# Patient Record
Sex: MALE | Race: WHITE | NOT HISPANIC OR LATINO | Employment: FULL TIME | URBAN - METROPOLITAN AREA
[De-identification: names, ages, dates, MRNs, and addresses within clinical notes are randomized per-mention and may not be internally consistent; named-entity substitution may affect disease eponyms.]

---

## 2018-01-11 ENCOUNTER — OFFICE VISIT (OUTPATIENT)
Dept: FAMILY MEDICINE | Facility: CLINIC | Age: 53
End: 2018-01-11
Payer: COMMERCIAL

## 2018-01-11 VITALS
HEART RATE: 70 BPM | OXYGEN SATURATION: 99 % | WEIGHT: 237.6 LBS | TEMPERATURE: 97.1 F | SYSTOLIC BLOOD PRESSURE: 136 MMHG | BODY MASS INDEX: 33.14 KG/M2 | DIASTOLIC BLOOD PRESSURE: 88 MMHG

## 2018-01-11 DIAGNOSIS — H69.91 DYSFUNCTION OF RIGHT EUSTACHIAN TUBE: Primary | ICD-10-CM

## 2018-01-11 PROCEDURE — 99213 OFFICE O/P EST LOW 20 MIN: CPT | Performed by: PHYSICIAN ASSISTANT

## 2018-01-11 RX ORDER — FLUTICASONE PROPIONATE 50 MCG
1-2 SPRAY, SUSPENSION (ML) NASAL DAILY
Qty: 1 G | Refills: 11 | Status: SHIPPED | OUTPATIENT
Start: 2018-01-11 | End: 2020-03-02

## 2018-01-11 NOTE — PROGRESS NOTES
SUBJECTIVE:                                                    Dimitris Munoz is a 52 year old male who presents to clinic today for the following health issues:    Ear Problem      Duration:  2 weeks     Description (location/character/radiation): right ear pain, ringing, and loss of hearing     Intensity:  moderate    Accompanying signs and symptoms:  Feels like fluid in ears     History (similar episodes/previous evaluation): was seen at an urgent care    Precipitating or alleviating factors:     Therapies tried and outcome: prednisone, Augmentin    Overall much better. No pain, sinus symptoms are gone. Just con't ear pressure, fullness.       Problem list and histories reviewed & adjusted, as indicated.  Additional history: as documented    Patient Active Problem List   Diagnosis     CARDIOVASCULAR SCREENING; LDL GOAL LESS THAN 160     High triglycerides     Past Surgical History:   Procedure Laterality Date     COLONOSCOPY WITH CO2 INSUFFLATION N/A 4/2/2015    Procedure: COLONOSCOPY WITH CO2 INSUFFLATION;  Surgeon: Alfredo Kowalski MD;  Location: [facility] OR     HERNIA REPAIR         Social History   Substance Use Topics     Smoking status: Never Smoker     Smokeless tobacco: Never Used     Alcohol use Yes      Comment: 10 beers per week     Family History   Problem Relation Age of Onset     CANCER Father 53     skin cancer         Current Outpatient Prescriptions   Medication Sig Dispense Refill     fluticasone (FLONASE) 50 MCG/ACT spray Spray 1-2 sprays into both nostrils daily 1 g 11     IBUPROFEN PO Take 200 mg by mouth 3 times daily 400 to 600 mg       tadalafil (CIALIS) 20 MG tablet Take 0.5-1 tablets (10-20 mg) by mouth daily as needed for erectile dysfunction Never use with nitroglycerin, terazosin or doxazosin. (Patient not taking: Reported on 1/11/2018) 12 tablet 11     No Known Allergies  BP Readings from Last 3 Encounters:   01/11/18 136/88   08/25/16 130/70   11/18/15 128/80    Wt Readings from  Last 3 Encounters:   01/11/18 237 lb 9.6 oz (107.8 kg)   08/25/16 224 lb (101.6 kg)   11/18/15 232 lb (105.2 kg)                  Labs reviewed in EPIC        ROS:  Constitutional, HEENT, cardiovascular, pulmonary, gi and gu systems are negative, except as otherwise noted.      OBJECTIVE:   /88  Pulse 70  Temp 97.1  F (36.2  C) (Oral)  Wt 237 lb 9.6 oz (107.8 kg)  SpO2 99%  BMI 33.14 kg/m2  Body mass index is 33.14 kg/(m^2).  GENERAL: healthy, alert and no distress  Head: Normocephalic, atraumatic.  Eyes: Conjunctiva clear, non icteric. PERRLA.  Ears: External ears normal BL. TM: Normal, slight fluid right TM   Nose: Septum midline, nasal mucosa pink and moist. No discharge.  Mouth / Throat: Normal dentition.  No oral lesions. Pharynx non erythematous, tonsils without hypertrophy.  Neck: Supple, no enlarged LN, trachea midline.   RESP: lungs clear to auscultation - no rales, rhonchi or wheezes  CV: regular rate and rhythm, normal S1 S2, no S3 or S4, no murmur, click or rub, no peripheral edema       ASSESSMENT/PLAN:          ICD-10-CM    1. Dysfunction of right eustachian tube H69.81 fluticasone (FLONASE) 50 MCG/ACT spray   Warning signs discussed.  side effects discussed  Symptomatic treatment: such as fluids,  OTC acetaminophen and /or non-steroidal anti-inflammatory medication.  Follow up  4 wks as needed     Byron Modi PA-C  [facility]

## 2018-01-11 NOTE — NURSING NOTE
"Chief Complaint   Patient presents with     Ear Problem       Initial /88  Pulse 70  Temp 97.1  F (36.2  C) (Oral)  Wt 237 lb 9.6 oz (107.8 kg)  SpO2 99%  BMI 33.14 kg/m2 Estimated body mass index is 33.14 kg/(m^2) as calculated from the following:    Height as of 11/18/15: 5' 11\" (1.803 m).    Weight as of this encounter: 237 lb 9.6 oz (107.8 kg).  Medication Reconciliation: complete  Katie Smith CMA    "

## 2018-01-11 NOTE — MR AVS SNAPSHOT
After Visit Summary   1/11/2018    Dimitris Munoz    MRN: [id number]           Patient Information     Date Of Birth          1965        Visit Information        Provider Department      1/11/2018 1:30 PM Byron Modi PA-C [facility]        Today's Diagnoses     Dysfunction of right eustachian tube    -  1       Follow-ups after your visit        Who to contact     If you have questions or need follow up information about today's clinic visit or your schedule please contact [facility] directly at [phone].  Normal or non-critical lab and imaging results will be communicated to you by [employer], letter or phone within 4 business days after the clinic has received the results. If you do not hear from us within 7 days, please contact the clinic through [facility] or phone. If you have a critical or abnormal lab result, we will notify you by phone as soon as possible.  Submit refill requests through [employer] or call your pharmacy and they will forward the refill request to us. Please allow 3 business days for your refill to be completed.          Additional Information About Your Visit        MyChart Information     [employer] gives you secure access to your electronic health record. If you see a primary care provider, you can also send messages to your care team and make appointments. If you have questions, please call your primary care clinic.  If you do not have a primary care provider, please call [phone] and they will assist you.        Care EveryWhere ID     This is your Care EveryWhere ID. This could be used by other organizations to access your [facility] medical records  [id number]        Your Vitals Were     Pulse Temperature Pulse Oximetry BMI (Body Mass Index)          70 97.1  F (36.2  C) (Oral) 99% 33.14 kg/m2         Blood Pressure from Last 3 Encounters:   01/11/18 136/88   08/25/16 130/70   11/18/15 128/80    Weight from Last 3 Encounters:    01/11/18 237 lb 9.6 oz (107.8 kg)   08/25/16 224 lb (101.6 kg)   11/18/15 232 lb (105.2 kg)              Today, you had the following     No orders found for display         Today's Medication Changes          These changes are accurate as of: 1/11/18  2:13 PM.  If you have any questions, ask your nurse or doctor.               Start taking these medicines.        Dose/Directions    fluticasone 50 MCG/ACT spray   Commonly known as:  FLONASE   Used for:  Dysfunction of right eustachian tube   Started by:  Byron Modi PA-C        Dose:  1-2 spray   Spray 1-2 sprays into both nostrils daily   Quantity:  1 g   Refills:  11            Where to get your medicines      These medications were sent to [facility] #[id number] - [place], [place] - [address],  [address]  [address], , [place] [facility] [address]     Phone:  [phone]     fluticasone 50 MCG/ACT spray                Primary Care Provider Office Phone # Fax #    Byron Modi PA-C [phone] [address] [address] [address]        Equal Access to Services     MANJIT ALMANZAR AH: Hadii doug patel hadasho Soomaali, waaxda luqadaha, qaybta kaalmada adeegyada, waxay mary alice haybernice smith. So [facility] [phone].    ATENCIÓN: Si habla español, tiene a nelson disposición servicios gratuitos de asistencia lingüística. Llame al [phone].    We comply with applicable federal civil rights laws and [place] laws. We do not discriminate on the basis of race, color, national origin, age, disability, sex, sexual orientation, or gender identity.            Thank you!     Thank you for choosing [facility]  for your care. Our goal is always to provide you with excellent care. Hearing back from our patients is one way we can continue to improve our services. Please take a few minutes to complete the written survey that you may receive in the mail after your visit with us. Thank you!              Your Updated Medication List - Protect others around you: Learn how to safely use, store and throw away your medicines at www.disposemymeds.org.          This list is accurate as of: 1/11/18  2:13 PM.  Always use your most recent med list.                   Brand Name Dispense Instructions for use Diagnosis    fluticasone 50 MCG/ACT spray    FLONASE    1 g    Spray 1-2 sprays into both nostrils daily    Dysfunction of right eustachian tube       IBUPROFEN PO      Take 200 mg by mouth 3 times daily 400 to 600 mg        tadalafil 20 MG tablet    CIALIS    12 tablet    Take 0.5-1 tablets (10-20 mg) by mouth daily as needed for erectile dysfunction Never use with nitroglycerin, terazosin or doxazosin.    ED (erectile dysfunction)

## 2018-08-08 NOTE — PROGRESS NOTES
"  SUBJECTIVE:   CC: Dimitris Munoz is an 52 year old male who presents for preventative health visit.     Healthy Habits:    Do you get at least three servings of calcium containing foods daily (dairy, green leafy vegetables, etc.)? {YES/NO, DAIRY INTAKE:082946::\"yes\"}    Amount of exercise or daily activities, outside of work: {AMOUNT EXERCISE:868449}    Problems taking medications regularly {Yes /No default:407021::\"No\"}    Medication side effects: {Yes /No default.:274311::\"No\"}    Have you had an eye exam in the past two years? {YESNOBLANK:682393}    Do you see a dentist twice per year? {YESNOBLANK:119201}    Do you have sleep apnea, excessive snoring or daytime drowsiness?{YESNOBLANK:136096}  {Outside tests to abstract? :280287}     {additional problems to add (Optional):343300}    Today's PHQ-2 Score:   PHQ-2 ( 1999 [employer]) 11/18/2015 7/21/2015   Q1: Little interest or pleasure in doing things 0 0   Q2: Feeling down, depressed or hopeless 0 0   PHQ-2 Score 0 0     {PHQ-2 LOOK IN ASSESSMENTS :291009}  Abuse: Current or Past(Physical, Sexual or Emotional)- {YES/NO/NA:161664}  Do you feel safe in your environment - {YES/NO/NA:932199}    Social History   Substance Use Topics     Smoking status: Never Smoker     Smokeless tobacco: Never Used     Alcohol use Yes      Comment: 10 beers per week      If you drink alcohol do you typically have >3 drinks per day or >7 drinks per week? {ETOH :579215}                      Last PSA:   PSA   Date Value Ref Range Status   06/23/2009 0.78 0 - 4 ug/L Final       Reviewed orders with patient. Reviewed health maintenance and updated orders accordingly - {Yes/No:812674::\"Yes\"}  {Chronicprobdata (Optional):271914}    Reviewed and updated as needed this visit by clinical staff         Reviewed and updated as needed this visit by Provider        {HISTORY OPTIONS (Optional):955129}    ROS:  { :449352::\"CONSTITUTIONAL: NEGATIVE for fever, chills, change in " "weight\",\"INTEGUMENTARY/SKIN: NEGATIVE for worrisome rashes, moles or lesions\",\"EYES: NEGATIVE for vision changes or irritation\",\"ENT: NEGATIVE for ear, mouth and throat problems\",\"RESP: NEGATIVE for significant cough or SOB\",\"CV: NEGATIVE for chest pain, palpitations or peripheral edema\",\"GI: NEGATIVE for nausea, abdominal pain, heartburn, or change in bowel habits\",\" male: negative for dysuria, hematuria, decreased urinary stream, erectile dysfunction, urethral discharge\",\"MUSCULOSKELETAL: NEGATIVE for significant arthralgias or myalgia\",\"NEURO: NEGATIVE for weakness, dizziness or paresthesias\",\"PSYCHIATRIC: NEGATIVE for changes in mood or affect\"}    OBJECTIVE:   There were no vitals taken for this visit.  EXAM:  {Exam Choices:048975}    {Diagnostic Test Results (Optional):072290::\"Diagnostic Test Results:\",\"none \"}    ASSESSMENT/PLAN:   {Diag Picklist:072917}    COUNSELING:  {MALE COUNSELING MESSAGES:018005::\"Reviewed preventive health counseling, as reflected in patient instructions\"}    BP Readings from Last 1 Encounters:   01/11/18 136/88     Estimated body mass index is 33.14 kg/(m^2) as calculated from the following:    Height as of 11/18/15: 5' 11\" (1.803 m).    Weight as of 1/11/18: 237 lb 9.6 oz (107.8 kg).    {BP Counseling- Complete if BP >= 120/80  (Optional):919676}  {Weight Management Plan (ACO) Complete if BMI is abnormal-  Ages 18-64  BMI >24.9.  Age 65+ with BMI <23 or >30 (Optional):336160}     reports that he has never smoked. He has never used smokeless tobacco.  {Tobacco Cessation -- Complete if patient is a smoker (Optional):045927}    Counseling Resources:  ATP IV Guidelines  Pooled Cohorts Equation Calculator  FRAX Risk Assessment  ICSI Preventive Guidelines  Dietary Guidelines for Americans, 2010  USDA's MyPlate  ASA Prophylaxis  Lung CA Screening    Byron Modi PA-C  [facility]  "

## 2018-08-09 ENCOUNTER — OFFICE VISIT (OUTPATIENT)
Dept: FAMILY MEDICINE | Facility: CLINIC | Age: 53
End: 2018-08-09
Payer: COMMERCIAL

## 2018-08-09 VITALS
RESPIRATION RATE: 16 BRPM | OXYGEN SATURATION: 97 % | TEMPERATURE: 98.1 F | HEIGHT: 71 IN | HEART RATE: 57 BPM | WEIGHT: 226 LBS | SYSTOLIC BLOOD PRESSURE: 124 MMHG | DIASTOLIC BLOOD PRESSURE: 76 MMHG | BODY MASS INDEX: 31.64 KG/M2

## 2018-08-09 DIAGNOSIS — Z80.8 FH: MELANOMA: ICD-10-CM

## 2018-08-09 DIAGNOSIS — Z12.5 SPECIAL SCREENING FOR MALIGNANT NEOPLASM OF PROSTATE: ICD-10-CM

## 2018-08-09 DIAGNOSIS — L72.3 SEBACEOUS CYST: ICD-10-CM

## 2018-08-09 DIAGNOSIS — Z00.00 ROUTINE GENERAL MEDICAL EXAMINATION AT A HEALTH CARE FACILITY: Primary | ICD-10-CM

## 2018-08-09 DIAGNOSIS — L98.9 SKIN LESION: ICD-10-CM

## 2018-08-09 DIAGNOSIS — Z11.59 NEED FOR HEPATITIS C SCREENING TEST: ICD-10-CM

## 2018-08-09 LAB
ALBUMIN SERPL-MCNC: 3.6 G/DL (ref 3.4–5)
ALP SERPL-CCNC: 71 U/L (ref 40–150)
ALT SERPL W P-5'-P-CCNC: 28 U/L (ref 0–70)
ANION GAP SERPL CALCULATED.3IONS-SCNC: 6 MMOL/L (ref 3–14)
AST SERPL W P-5'-P-CCNC: 19 U/L (ref 0–45)
BILIRUB SERPL-MCNC: 1 MG/DL (ref 0.2–1.3)
BUN SERPL-MCNC: 21 MG/DL (ref 7–30)
CALCIUM SERPL-MCNC: 8.7 MG/DL (ref 8.5–10.1)
CHLORIDE SERPL-SCNC: 108 MMOL/L (ref 94–109)
CHOLEST SERPL-MCNC: 127 MG/DL
CO2 SERPL-SCNC: 29 MMOL/L (ref 20–32)
CREAT SERPL-MCNC: 1.26 MG/DL (ref 0.66–1.25)
GFR SERPL CREATININE-BSD FRML MDRD: 59 ML/MIN/1.7M2
GLUCOSE SERPL-MCNC: 96 MG/DL (ref 70–99)
HCV AB SERPL QL IA: NONREACTIVE
HDLC SERPL-MCNC: 27 MG/DL
LDLC SERPL CALC-MCNC: 65 MG/DL
NONHDLC SERPL-MCNC: 100 MG/DL
POTASSIUM SERPL-SCNC: 4.2 MMOL/L (ref 3.4–5.3)
PROT SERPL-MCNC: 6.6 G/DL (ref 6.8–8.8)
SODIUM SERPL-SCNC: 143 MMOL/L (ref 133–144)
TRIGL SERPL-MCNC: 177 MG/DL

## 2018-08-09 PROCEDURE — 80053 COMPREHEN METABOLIC PANEL: CPT | Performed by: PHYSICIAN ASSISTANT

## 2018-08-09 PROCEDURE — 84153 ASSAY OF PSA TOTAL: CPT | Mod: 90 | Performed by: PHYSICIAN ASSISTANT

## 2018-08-09 PROCEDURE — 80061 LIPID PANEL: CPT | Performed by: PHYSICIAN ASSISTANT

## 2018-08-09 PROCEDURE — 99000 SPECIMEN HANDLING OFFICE-LAB: CPT | Performed by: PHYSICIAN ASSISTANT

## 2018-08-09 PROCEDURE — 36415 COLL VENOUS BLD VENIPUNCTURE: CPT | Performed by: PHYSICIAN ASSISTANT

## 2018-08-09 PROCEDURE — 84154 ASSAY OF PSA FREE: CPT | Mod: 90 | Performed by: PHYSICIAN ASSISTANT

## 2018-08-09 PROCEDURE — 86803 HEPATITIS C AB TEST: CPT | Performed by: PHYSICIAN ASSISTANT

## 2018-08-09 PROCEDURE — 99396 PREV VISIT EST AGE 40-64: CPT | Performed by: PHYSICIAN ASSISTANT

## 2018-08-09 ASSESSMENT — ENCOUNTER SYMPTOMS
COUGH: 0
FEVER: 0
PARESTHESIAS: 0
DIARRHEA: 0
HEARTBURN: 0
JOINT SWELLING: 0
EYE PAIN: 0
PALPITATIONS: 1
NAUSEA: 0
CHILLS: 0
HEADACHES: 1
MYALGIAS: 0
FREQUENCY: 0
ARTHRALGIAS: 1
DIZZINESS: 0
WEAKNESS: 0
NERVOUS/ANXIOUS: 0
SHORTNESS OF BREATH: 0
DYSURIA: 0
CONSTIPATION: 0
SORE THROAT: 0

## 2018-08-09 NOTE — PROGRESS NOTES
SUBJECTIVE:   CC: iDmitris Munoz is an 52 year old male who presents for preventative health visit.     Physical   Annual:     Getting at least 3 servings of Calcium per day:  Yes    Bi-annual eye exam:  Yes    Dental care twice a year:  Yes    Sleep apnea or symptoms of sleep apnea:  Daytime drowsiness and Excessive snoring    Diet:  Regular (no restrictions) and Breakfast skipped    Frequency of exercise:  2-3 days/week    Duration of exercise:  Greater than 60 minutes    Taking medications regularly:  Yes    Medication side effects:  None    Additional concerns today:  No    Mole on right side - changing in size and appearance. Noticed about 6 months ago   Lump on right side of neck he would like looked at- been there for years. No changes.   Father  of melanoma. He has never seen a dermatologist.       Today's PHQ-2 Score:   PHQ-2 (  [employer]) 2018   Q1: Little interest or pleasure in doing things 0   Q2: Feeling down, depressed or hopeless 0   PHQ-2 Score 0   Q1: Little interest or pleasure in doing things Not at all   Q2: Feeling down, depressed or hopeless Not at all   PHQ-2 Score 0       Abuse: Current or Past(Physical, Sexual or Emotional)- No  Do you feel safe in your environment - Yes    Social History   Substance Use Topics     Smoking status: Never Smoker     Smokeless tobacco: Never Used     Alcohol use Yes      Comment: 10 beers per week     Alcohol Use 2018   If you drink alcohol do you typically have greater than 3 drinks per day OR greater than 7 drinks per week? No       Last PSA:   PSA   Date Value Ref Range Status   2009 0.78 0 - 4 ug/L Final       Reviewed orders with patient. Reviewed health maintenance and updated orders accordingly - Yes  Labs reviewed in EPIC  BP Readings from Last 3 Encounters:   18 124/76   18 136/88   16 130/70    Wt Readings from Last 3 Encounters:   18 226 lb (102.5 kg)   18 237 lb 9.6 oz (107.8 kg)   16 224 lb  (101.6 kg)                  Patient Active Problem List   Diagnosis     CARDIOVASCULAR SCREENING; LDL GOAL LESS THAN 160     High triglycerides     FH: melanoma     Past Surgical History:   Procedure Laterality Date     COLONOSCOPY WITH CO2 INSUFFLATION N/A 4/2/2015    Procedure: COLONOSCOPY WITH CO2 INSUFFLATION;  Surgeon: Alfredo Kowalski MD;  Location: [facility] OR     HERNIA REPAIR         Social History   Substance Use Topics     Smoking status: Never Smoker     Smokeless tobacco: Never Used     Alcohol use Yes      Comment: 10 beers per week     Family History   Problem Relation Age of Onset     Cancer Father 53     skin cancer         Current Outpatient Prescriptions   Medication Sig Dispense Refill     IBUPROFEN PO Take 200 mg by mouth 3 times daily 400 to 600 mg       fluticasone (FLONASE) 50 MCG/ACT spray Spray 1-2 sprays into both nostrils daily (Patient not taking: Reported on 8/9/2018) 1 g 11     tadalafil (CIALIS) 20 MG tablet Take 0.5-1 tablets (10-20 mg) by mouth daily as needed for erectile dysfunction Never use with nitroglycerin, terazosin or doxazosin. (Patient not taking: Reported on 8/9/2018) 12 tablet 11     No Known Allergies    Reviewed and updated as needed this visit by clinical staff  Tobacco  Allergies  Meds  Med Hx  Surg Hx  Fam Hx  Soc Hx        Reviewed and updated as needed this visit by Provider          Past Medical History:   Diagnosis Date     CARDIOVASCULAR SCREENING; LDL GOAL LESS THAN 160       Past Surgical History:   Procedure Laterality Date     COLONOSCOPY WITH CO2 INSUFFLATION N/A 4/2/2015    Procedure: COLONOSCOPY WITH CO2 INSUFFLATION;  Surgeon: Alfredo Kowalski MD;  Location: [facility] OR     HERNIA REPAIR         Review of Systems   Constitutional: Negative for chills and fever.   HENT: Negative for congestion, ear pain, hearing loss and sore throat.    Eyes: Negative for pain and visual disturbance.   Respiratory: Negative for cough and shortness of breath.   "  Cardiovascular: Positive for palpitations. Negative for peripheral edema.   Gastrointestinal: Negative for constipation, diarrhea, heartburn and nausea.   Genitourinary: Positive for impotence. Negative for discharge, dysuria, frequency, genital sores and urgency.   Musculoskeletal: Positive for arthralgias. Negative for joint swelling and myalgias.   Skin: Negative for rash.   Neurological: Positive for headaches. Negative for dizziness, weakness and paresthesias.   Psychiatric/Behavioral: Negative for mood changes. The patient is not nervous/anxious.      CONSTITUTIONAL: NEGATIVE for fever, chills, change in weight  INTEGUMENTARY/SKIN: NEGATIVE for worrisome rashes, moles or lesions  EYES: NEGATIVE for vision changes or irritation  ENT: NEGATIVE for ear, mouth and throat problems  RESP: NEGATIVE for significant cough or SOB  CV: NEGATIVE for chest pain, palpitations or peripheral edema  GI: NEGATIVE for nausea, abdominal pain, heartburn, or change in bowel habits   male: negative for dysuria, hematuria, decreased urinary stream, erectile dysfunction, urethral discharge  MUSCULOSKELETAL: NEGATIVE for significant arthralgias or myalgia  NEURO: NEGATIVE for weakness, dizziness or paresthesias  PSYCHIATRIC: NEGATIVE for changes in mood or affect    OBJECTIVE:   /76  Pulse 57  Temp 98.1  F (36.7  C) (Oral)  Resp 16  Ht 5' 11\" (1.803 m)  Wt 226 lb (102.5 kg)  SpO2 97%  BMI 31.52 kg/m2    Physical Exam  GENERAL: healthy, alert and no distress  EYES: Eyes grossly normal to inspection, PERRL and conjunctivae and sclerae normal  HENT: ear canals and TM's normal, nose and mouth without ulcers or lesions  NECK: no adenopathy, no asymmetry, masses, or scars and thyroid normal to palpation  RESP: lungs clear to auscultation - no rales, rhonchi or wheezes  CV: regular rate and rhythm, normal S1 S2, no S3 or S4, no murmur, click or rub, no peripheral edema and peripheral pulses strong  ABDOMEN: soft, nontender, " "no hepatosplenomegaly, no masses and bowel sounds normal   (male): normal male genitalia without lesions or urethral discharge, no hernia  MS: no gross musculoskeletal defects noted, no edema  SKIN: no suspicious lesions or rashes  SKIN: no suspicious lesions or rashes and a very small cherry angioma on abdomin. Right upper neck with 1 cm mobile cystic lesion.   NEURO: Normal strength and tone, mentation intact and speech normal  PSYCH: mentation appears normal, affect normal/bright    Diagnostic Test Results:  No results found for this or any previous visit (from the past 24 hour(s)).    ASSESSMENT/PLAN:       ICD-10-CM    1. Routine general medical examination at a health care facility Z00.00 Lipid panel reflex to direct LDL Fasting     Comprehensive metabolic panel   2. Sebaceous cyst L72.3    3. Skin lesion L98.9 DERMATOLOGY REFERRAL   4. Need for hepatitis C screening test Z11.59 Hepatitis C Screen Reflex to HCV RNA Quant and Genotype   5. Special screening for malignant neoplasm of prostate Z12.5 PSA, total and free   6. FH: melanoma Z80.8 DERMATOLOGY REFERRAL       COUNSELING:   Reviewed preventive health counseling, as reflected in patient instructions       Regular exercise       Healthy diet/nutrition    BP Readings from Last 1 Encounters:   08/09/18 124/76     Estimated body mass index is 31.52 kg/(m^2) as calculated from the following:    Height as of this encounter: 5' 11\" (1.803 m).    Weight as of this encounter: 226 lb (102.5 kg).      Weight management plan: Discussed healthy diet and exercise guidelines and patient will follow up in 12 months in clinic to re-evaluate.     reports that he has never smoked. He has never used smokeless tobacco.      Counseling Resources:  ATP IV Guidelines  Pooled Cohorts Equation Calculator  FRAX Risk Assessment  ICSI Preventive Guidelines  Dietary Guidelines for Americans, 2010  USDA's MyPlate  ASA Prophylaxis  Lung CA Screening    Byron Modi, " TANNER  [facility]

## 2018-08-09 NOTE — NURSING NOTE
"Chief Complaint   Patient presents with     Physical     Health Maintenance     PHQ2       Initial /76  Pulse 57  Temp 98.1  F (36.7  C) (Oral)  Resp 16  Ht 5' 11\" (1.803 m)  Wt 226 lb (102.5 kg)  SpO2 97%  BMI 31.52 kg/m2 Estimated body mass index is 31.52 kg/(m^2) as calculated from the following:    Height as of this encounter: 5' 11\" (1.803 m).    Weight as of this encounter: 226 lb (102.5 kg).  Medication Reconciliation: complete  Katie Smith CMA    "

## 2018-08-09 NOTE — MR AVS SNAPSHOT
After Visit Summary   8/9/2018    Dimitris Munoz    MRN: [id number]           Patient Information     Date Of Birth          1965        Visit Information        Provider Department      8/9/2018 7:00 AM Byron Modi PA-C [facility]        Today's Diagnoses     Routine general medical examination at a health care facility    -  1    Sebaceous cyst        Skin lesion        Need for hepatitis C screening test        Special screening for malignant neoplasm of prostate        FH: melanoma          Care Instructions      Preventive Health Recommendations  Male Ages 50 - 64    Yearly exam:             See your health care provider every year in order to  o   Review health changes.   o   Discuss preventive care.    o   Review your medicines if your doctor has prescribed any.     Have a cholesterol test every 5 years, or more frequently if you are at risk for high cholesterol/heart disease.     Have a diabetes test (fasting glucose) every three years. If you are at risk for diabetes, you should have this test more often.     Have a colonoscopy at age 50, or have a yearly FIT test (stool test). These exams will check for colon cancer.      Talk with your health care provider about whether or not a prostate cancer screening test (PSA) is right for you.    You should be tested each year for STDs (sexually transmitted diseases), if you re at risk.     Shots: Get a flu shot each year. Get a tetanus shot every 10 years.     Nutrition:    Eat at least 5 servings of fruits and vegetables daily.     Eat whole-grain bread, whole-wheat pasta and brown rice instead of white grains and rice.     Get adequate Calcium and Vitamin D.     Lifestyle    Exercise for at least 150 minutes a week (30 minutes a day, 5 days a week). This will help you control your weight and prevent disease.     Limit alcohol to one drink per day.     No smoking.     Wear sunscreen to prevent skin cancer.     See your  dentist every six months for an exam and cleaning.     See your eye doctor every 1 to 2 years.            Follow-ups after your visit        Additional Services     DERMATOLOGY REFERRAL       Your provider has referred you to: FMG: [facility] [place] [phone]   [email or web address]  UMP: [facility] [place] [phone]   [email or web address]  [facility] [place] [facility] [phone]   [email or web address]    Please be aware that coverage of these services is subject to the terms and limitations of your health insurance plan.  Call member services at your health plan with any benefit or coverage questions.      Please bring the following with you to your appointment:    (1) Any X-Rays, CTs or MRIs which have been performed.  Contact the facility where they were done to arrange for  prior to your scheduled appointment.    (2) List of current medications  (3) This referral request   (4) Any documents/labs given to you for this referral                  Who to contact     If you have questions or need follow up information about today's clinic visit or your schedule please contact [facility] directly at [phone].  Normal or non-critical lab and imaging results will be communicated to you by MyChart, letter or phone within 4 business days after the clinic has received the results. If you do not hear from us within 7 days, please contact the clinic through MyChart or phone. If you have a critical or abnormal lab result, we will notify you by phone as soon as possible.  Submit refill requests through [employer] or call your pharmacy and they will forward the refill request to us. Please allow 3 business days for your refill to be completed.          Additional Information About Your Visit        [employer] Information     [employer] gives you secure  "access to your electronic health record. If you see a primary care provider, you can also send messages to your care team and make appointments. If you have questions, please call your primary care clinic.  If you do not have a primary care provider, please call [phone] and they will assist you.        Care EveryWhere ID     This is your Care EveryWhere ID. This could be used by other organizations to access your [place] medical records  [id number]        Your Vitals Were     Pulse Temperature Respirations Height Pulse Oximetry BMI (Body Mass Index)    57 98.1  F (36.7  C) (Oral) 16 5' 11\" (1.803 m) 97% 31.52 kg/m2       Blood Pressure from Last 3 Encounters:   08/09/18 124/76   01/11/18 136/88   08/25/16 130/70    Weight from Last 3 Encounters:   08/09/18 226 lb (102.5 kg)   01/11/18 237 lb 9.6 oz (107.8 kg)   08/25/16 224 lb (101.6 kg)              We Performed the Following     Comprehensive metabolic panel     DERMATOLOGY REFERRAL     Hepatitis C Screen Reflex to HCV RNA Quant and Genotype     Lipid panel reflex to direct LDL Fasting     PSA, total and free        Primary Care Provider Office Phone # Fax #    Byron Modi PA-C [phone] [facility] [address]        Equal Access to Services     MANJIT ALMANZAR : Hadii aad ku hadasho Soomaali, waaxda luqadaha, qaybta kaalmada adeegyada, estrellita smith. So [facility] [phone].    ATENCIÓN: Si habla español, tiene a nelson disposición servicios gratuitos de asistencia lingüística. Llame al [phone].    We comply with applicable federal civil rights laws and [place] laws. We do not discriminate on the basis of race, color, national origin, age, disability, sex, sexual orientation, or gender identity.            Thank you!     Thank you for choosing [facility]  for your care. Our goal is always to provide you with excellent care. Hearing back from our patients is one way we can " continue to improve our services. Please take a few minutes to complete the written survey that you may receive in the mail after your visit with us. Thank you!             Your Updated Medication List - Protect others around you: Learn how to safely use, store and throw away your medicines at www.disposemymeds.org.          This list is accurate as of 8/9/18  7:29 AM.  Always use your most recent med list.                   Brand Name Dispense Instructions for use Diagnosis    fluticasone 50 MCG/ACT spray    FLONASE    1 g    Spray 1-2 sprays into both nostrils daily    Dysfunction of right eustachian tube       IBUPROFEN PO      Take 200 mg by mouth 3 times daily 400 to 600 mg        tadalafil 20 MG tablet    CIALIS    12 tablet    Take 0.5-1 tablets (10-20 mg) by mouth daily as needed for erectile dysfunction Never use with nitroglycerin, terazosin or doxazosin.    ED (erectile dysfunction)

## 2018-08-10 LAB
PSA FREE MFR SERPL: 29 %
PSA FREE SERPL-MCNC: 0.2 NG/ML
PSA SERPL-MCNC: 0.7 NG/ML (ref 0–4)

## 2018-08-10 NOTE — PROGRESS NOTES
Mr. Munoz,    All of your labs were normal for you.    Please contact the clinic if you have additional questions.  Thank you.    Sincerely,    Byron Modi PA-C

## 2018-09-24 NOTE — PROGRESS NOTES
SUBJECTIVE:   Diimtris Munoz is a 53 year old male who presents to clinic today for the following health issues:      Gout  Duration: 1 week  Description   Location: big toe - left  Joint Swelling: YES- last night  Redness: YES  Pain intensity:  mild  Accompanying signs and symptoms: warm  History  Previous history of gout: YES   Trauma to the area: no   Precipitating factors:   Alcohol usage: Occassional  Diuretic use: no   Recent illness: no   Therapies tried and outcome: drinking fluids    States is mostly has affected his left toe. About couple flare up's a year. Noticed when he is camping and consuming more ETOH. Otherwise doesn't drink much. Couple beers on weekends.     Problem list and histories reviewed & adjusted, as indicated.  Additional history: as documented    Patient Active Problem List   Diagnosis     CARDIOVASCULAR SCREENING; LDL GOAL LESS THAN 160     High triglycerides     FH: melanoma     Past Surgical History:   Procedure Laterality Date     COLONOSCOPY WITH CO2 INSUFFLATION N/A 4/2/2015    Procedure: COLONOSCOPY WITH CO2 INSUFFLATION;  Surgeon: Alfredo Kowalski MD;  Location: [facility] OR     HERNIA REPAIR         Social History   Substance Use Topics     Smoking status: Never Smoker     Smokeless tobacco: Never Used     Alcohol use Yes      Comment: 10 beers per week     Family History   Problem Relation Age of Onset     Cancer Father 53     skin cancer         Current Outpatient Prescriptions   Medication Sig Dispense Refill     indomethacin (INDOCIN) 50 MG capsule Take 1 capsule (50 mg) by mouth 3 times daily (with meals) 42 capsule 0     fluticasone (FLONASE) 50 MCG/ACT spray Spray 1-2 sprays into both nostrils daily (Patient not taking: Reported on 8/9/2018) 1 g 11     IBUPROFEN PO Take 200 mg by mouth 3 times daily 400 to 600 mg       tadalafil (CIALIS) 20 MG tablet Take 0.5-1 tablets (10-20 mg) by mouth daily as needed for erectile dysfunction Never use with nitroglycerin, terazosin  "or doxazosin. (Patient not taking: Reported on 8/9/2018) 12 tablet 11     No Known Allergies  BP Readings from Last 3 Encounters:   09/25/18 132/80   08/09/18 124/76   01/11/18 136/88    Wt Readings from Last 3 Encounters:   09/25/18 230 lb (104.3 kg)   08/09/18 226 lb (102.5 kg)   01/11/18 237 lb 9.6 oz (107.8 kg)                  Labs reviewed in EPIC    Reviewed and updated as needed this visit by clinical staff  Tobacco  Allergies  Meds       Reviewed and updated as needed this visit by Provider         ROS:  Constitutional, HEENT, cardiovascular, pulmonary, gi and gu systems are negative, except as otherwise noted.    OBJECTIVE:     /80  Pulse 57  Temp 97.3  F (36.3  C) (Oral)  Ht 5' 11\" (1.803 m)  Wt 230 lb (104.3 kg)  SpO2 98%  BMI 32.08 kg/m2  Body mass index is 32.08 kg/(m^2).  GENERAL: healthy, alert and no distress  Right great toe: IP joint tenderness, erythema, stiffness.   Neurovascularly Intact Distally.   Calves soft non-tender     Diagnostic Test Results:  No results found for this or any previous visit (from the past 24 hour(s)).   X-ray right great toe: neg. pending radiology     ASSESSMENT/PLAN:         ICD-10-CM    1. Great toe pain, right M79.674 Uric acid     ESR: Erythrocyte sedimentation rate     XR Toe Right G/E 2 Views     indomethacin (INDOCIN) 50 MG capsule   2. Acute gouty arthritis M10.9 Uric acid     ESR: Erythrocyte sedimentation rate     XR Toe Right G/E 2 Views     indomethacin (INDOCIN) 50 MG capsule   3. Need for prophylactic vaccination and inoculation against influenza Z23 FLU VACCINE, SPLIT VIRUS, IM (QUADRIVALENT) [16750]- >3 YRS     Vaccine Administration, Initial [79491]   labs pending  Suspect gout.   See Patient Instructions  Work on Healthy diet and exercise. Getting heart rate elevated for 30 mins most days of week.   Dietary changes discussed.   warning signs discussed.  side effects discussed  Follow up  Depending of flare up incidences.     Byron" Elfego Modi PA-C  [facility]

## 2018-09-25 ENCOUNTER — OFFICE VISIT (OUTPATIENT)
Dept: FAMILY MEDICINE | Facility: CLINIC | Age: 53
End: 2018-09-25
Payer: COMMERCIAL

## 2018-09-25 ENCOUNTER — RADIANT APPOINTMENT (OUTPATIENT)
Dept: GENERAL RADIOLOGY | Facility: CLINIC | Age: 53
End: 2018-09-25
Attending: PHYSICIAN ASSISTANT
Payer: COMMERCIAL

## 2018-09-25 VITALS
WEIGHT: 230 LBS | SYSTOLIC BLOOD PRESSURE: 132 MMHG | BODY MASS INDEX: 32.2 KG/M2 | TEMPERATURE: 97.3 F | HEIGHT: 71 IN | HEART RATE: 57 BPM | OXYGEN SATURATION: 98 % | DIASTOLIC BLOOD PRESSURE: 80 MMHG

## 2018-09-25 DIAGNOSIS — Z23 NEED FOR PROPHYLACTIC VACCINATION AND INOCULATION AGAINST INFLUENZA: ICD-10-CM

## 2018-09-25 DIAGNOSIS — M10.9 ACUTE GOUTY ARTHRITIS: ICD-10-CM

## 2018-09-25 DIAGNOSIS — M79.674 GREAT TOE PAIN, RIGHT: Primary | ICD-10-CM

## 2018-09-25 DIAGNOSIS — M79.674 GREAT TOE PAIN, RIGHT: ICD-10-CM

## 2018-09-25 LAB
ERYTHROCYTE [SEDIMENTATION RATE] IN BLOOD BY WESTERGREN METHOD: 9 MM/H (ref 0–20)
URATE SERPL-MCNC: 7.5 MG/DL (ref 3.5–7.2)

## 2018-09-25 PROCEDURE — 90471 IMMUNIZATION ADMIN: CPT | Performed by: PHYSICIAN ASSISTANT

## 2018-09-25 PROCEDURE — 73660 X-RAY EXAM OF TOE(S): CPT | Mod: RT

## 2018-09-25 PROCEDURE — 90686 IIV4 VACC NO PRSV 0.5 ML IM: CPT | Performed by: PHYSICIAN ASSISTANT

## 2018-09-25 PROCEDURE — 85652 RBC SED RATE AUTOMATED: CPT | Performed by: PHYSICIAN ASSISTANT

## 2018-09-25 PROCEDURE — 99214 OFFICE O/P EST MOD 30 MIN: CPT | Mod: 25 | Performed by: PHYSICIAN ASSISTANT

## 2018-09-25 PROCEDURE — 36415 COLL VENOUS BLD VENIPUNCTURE: CPT | Performed by: PHYSICIAN ASSISTANT

## 2018-09-25 PROCEDURE — 84550 ASSAY OF BLOOD/URIC ACID: CPT | Performed by: PHYSICIAN ASSISTANT

## 2018-09-25 RX ORDER — INDOMETHACIN 50 MG/1
50 CAPSULE ORAL
Qty: 42 CAPSULE | Refills: 0 | Status: SHIPPED | OUTPATIENT
Start: 2018-09-25 | End: 2020-08-20

## 2018-09-25 NOTE — PROGRESS NOTES

## 2018-09-25 NOTE — PATIENT INSTRUCTIONS
Gout   What is gout?   Gout is a disease usually caused by having too much uric acid in your body. Too much uric acid may not cause symptoms for years, but after a time it usually causes painful joint inflammation (arthritis). The most common site of inflammation is the joint between the foot and the big toe. Later attacks often affect other joints of the foot and leg. Less often, the arms and hands are affected.   In addition to the arthritis, gout causes the formation of tophi. Tophi are lumpy deposits of uric acid crystals just under the skin. Common places for tophi to develop are in the outer edge of the ear, on or near the elbow, over the fingers and toes, and around the Achilles tendon in the ankle.   Gout can also cause kidney stones made of uric acid.   Most people who have gout are middle-aged men, but it can occur at any age. Only 5 to 10% of cases of gout occur in women, most often after menopause.   How does it occur?   Gout usually occurs because too much uric acid is in your joints. Uric acid comes from the breakdown of substances called purines. Purines are found in all of your body's tissues. They are also in many foods. Normally, uric acid dissolves in the blood and passes through the kidneys and out of the body in urine. If the levels of uric acid build up in the blood, sharp uric acid crystals may form in the joints. The crystals cause pain and swelling. You may have too much uric acid in your joints when your kidney does not get rid of enough uric acid or when your body makes too much uric acid.   Most cases of gout are caused by poor elimination of uric acid by the kidneys, but it can be hard to know why this is happening. The specific problem with the kidney is usually never found.   Some people inherit a tendency to make too much uric acid. Others may make too much uric acid because they have a disease such as cancer or certain types of red blood cell disorders. A diet high in  alcoholic drinks and purine-rich foods (such as seafood and meat, especially red meat and organ meats) can also cause your body to make too much uric acid.   Uric acid levels in men start to go up after puberty. Women's uric acid levels usually do not go up until after menopause. For this reason women are protected from gout until several years after menopause. The uric acid levels have to be high for many years before gout develops. Men with gout usually have their first attack when they are middle-aged.   Certain conditions, such as dehydration, can cause excess levels of uric acid. Diuretic medicine (also called water pills), which is often used to treat high blood pressure, can increase levels of uric acid. Other medicines can also affect the level of uric acid in the blood. It is important to make sure your healthcare provider knows all the medicines you are using, both prescription and nonprescription.   People who have recently had a serious illness or surgery have an increased chance of having an attack of gout. Some people have gouty arthritis even though they have normal uric acid levels.   What are the symptoms?   Some people have high uric acid blood levels for years and never have any symptoms. Only 10 to 20% of people with high levels develop symptoms in their joints, such as:     sudden, severe pain, especially of just one joint at a time     redness     swelling.   The sudden attacks are sometimes related to physical illness, trauma, or excessive alcohol use. The symptoms may last for days to weeks. The arthritis usually occurs before tophi or kidney stones develop.   The tophi do not cause any symptoms unless they open and drain. They are often not painful. Depending on their location, they may limit the movement of joints.   The symptoms of uric acid stones are like those of other kidney stones. They can cause severe abdominal pain and sometimes nausea, vomiting, fever, or blood in the urine.   How  is it diagnosed?   Your healthcare provider will suspect that you have gout if:     Your first toe joint is inflamed.     You have a blood test that shows a high level of uric acid in your blood.     You are developing tophi.     You start taking the drug colchicine and your symptoms of arthritis improve. (Colchicine, an anti-inflammatory drug, is effective only in gouty-type arthritis.)   To confirm the diagnosis, your provider may take a sample of fluid from the affected joint or joints and send it to the lab for tests. If you have uric acid crystals in the fluid, you have gout.   How is it treated?   Usually, if you have high uric acid levels but no symptoms, you will not need treatment. In special cases (for example, if you have a strong family history of gouty arthritis or kidney stones), you may be treated for gout even though you do not have any symptoms.   If you have symptoms of gout, the goals of treatment are:     Stop the pain of gouty arthritis or kidney stones.     Try to prevent the recurrence of these problems by controlling the uric acid levels.     Prevent serious complications such as kidney damage.   Treatment of the arthritis first involves the use of anti-inflammatory medicines, such as:     indomethacin     ibuprofen or naproxen     corticosteroid drugs, such as prednisone     colchicine.   Aspirin is not usually recommended because it may keep the urine from taking the uric acid out of the body.   Anti-inflammatory medicines are sometimes taken daily to prevent recurrent attacks of gouty arthritis. If the gouty arthritis becomes a frequent problem, allopurinol and probenecid may also be prescribed to prevent damaging deposits of uric acid in the joints.   How long will the effects last?   The sooner treatment is started, the sooner the symptoms stop, which may be within 24 to 48 hours. If gout is not treated, it could last a few days to several weeks. A second attack may occur, but usually  not for 6 months to 2 years. In other cases another attack may not occur until many years later, or never.   How can I help prevent gout?   There is no sure way to prevent gout. However, you can take these steps to lessen the chance that you will have high uric acid levels:     Eat a diet low in purines. Purine-containing foods that you should avoid include meat--especially red meat and organ meats (such as sweetbreads, liver, and kidney)--shrimp, anchovies, sardines, and dried legumes (beans).     Do not overindulge in alcohol. Do not drink more than 2 ounces a day.     Drink lots of fluids.

## 2018-09-25 NOTE — MR AVS SNAPSHOT
After Visit Summary   9/25/2018    Dimitris Munoz    MRN: [id number]           Patient Information     Date Of Birth          1965        Visit Information        Provider Department      9/25/2018 1:10 PM Byron Modi PA-C [facility]        Today's Diagnoses     Great toe pain, right    -  1    Acute gouty arthritis          Care Instructions                Gout   What is gout?   Gout is a disease usually caused by having too much uric acid in your body. Too much uric acid may not cause symptoms for years, but after a time it usually causes painful joint inflammation (arthritis). The most common site of inflammation is the joint between the foot and the big toe. Later attacks often affect other joints of the foot and leg. Less often, the arms and hands are affected.   In addition to the arthritis, gout causes the formation of tophi. Tophi are lumpy deposits of uric acid crystals just under the skin. Common places for tophi to develop are in the outer edge of the ear, on or near the elbow, over the fingers and toes, and around the Achilles tendon in the ankle.   Gout can also cause kidney stones made of uric acid.   Most people who have gout are middle-aged men, but it can occur at any age. Only 5 to 10% of cases of gout occur in women, most often after menopause.   How does it occur?   Gout usually occurs because too much uric acid is in your joints. Uric acid comes from the breakdown of substances called purines. Purines are found in all of your body's tissues. They are also in many foods. Normally, uric acid dissolves in the blood and passes through the kidneys and out of the body in urine. If the levels of uric acid build up in the blood, sharp uric acid crystals may form in the joints. The crystals cause pain and swelling. You may have too much uric acid in your joints when your kidney does not get rid of enough uric acid or when your body makes too much uric acid.    Most cases of gout are caused by poor elimination of uric acid by the kidneys, but it can be hard to know why this is happening. The specific problem with the kidney is usually never found.   Some people inherit a tendency to make too much uric acid. Others may make too much uric acid because they have a disease such as cancer or certain types of red blood cell disorders. A diet high in alcoholic drinks and purine-rich foods (such as seafood and meat, especially red meat and organ meats) can also cause your body to make too much uric acid.   Uric acid levels in men start to go up after puberty. Women's uric acid levels usually do not go up until after menopause. For this reason women are protected from gout until several years after menopause. The uric acid levels have to be high for many years before gout develops. Men with gout usually have their first attack when they are middle-aged.   Certain conditions, such as dehydration, can cause excess levels of uric acid. Diuretic medicine (also called water pills), which is often used to treat high blood pressure, can increase levels of uric acid. Other medicines can also affect the level of uric acid in the blood. It is important to make sure your healthcare provider knows all the medicines you are using, both prescription and nonprescription.   People who have recently had a serious illness or surgery have an increased chance of having an attack of gout. Some people have gouty arthritis even though they have normal uric acid levels.   What are the symptoms?   Some people have high uric acid blood levels for years and never have any symptoms. Only 10 to 20% of people with high levels develop symptoms in their joints, such as:     sudden, severe pain, especially of just one joint at a time     redness     swelling.   The sudden attacks are sometimes related to physical illness, trauma, or excessive alcohol use. The symptoms may last for days to weeks. The arthritis  usually occurs before tophi or kidney stones develop.   The tophi do not cause any symptoms unless they open and drain. They are often not painful. Depending on their location, they may limit the movement of joints.   The symptoms of uric acid stones are like those of other kidney stones. They can cause severe abdominal pain and sometimes nausea, vomiting, fever, or blood in the urine.   How is it diagnosed?   Your healthcare provider will suspect that you have gout if:     Your first toe joint is inflamed.     You have a blood test that shows a high level of uric acid in your blood.     You are developing tophi.     You start taking the drug colchicine and your symptoms of arthritis improve. (Colchicine, an anti-inflammatory drug, is effective only in gouty-type arthritis.)   To confirm the diagnosis, your provider may take a sample of fluid from the affected joint or joints and send it to the lab for tests. If you have uric acid crystals in the fluid, you have gout.   How is it treated?   Usually, if you have high uric acid levels but no symptoms, you will not need treatment. In special cases (for example, if you have a strong family history of gouty arthritis or kidney stones), you may be treated for gout even though you do not have any symptoms.   If you have symptoms of gout, the goals of treatment are:     Stop the pain of gouty arthritis or kidney stones.     Try to prevent the recurrence of these problems by controlling the uric acid levels.     Prevent serious complications such as kidney damage.   Treatment of the arthritis first involves the use of anti-inflammatory medicines, such as:     indomethacin     ibuprofen or naproxen     corticosteroid drugs, such as prednisone     colchicine.   Aspirin is not usually recommended because it may keep the urine from taking the uric acid out of the body.   Anti-inflammatory medicines are sometimes taken daily to prevent recurrent attacks of gouty arthritis. If the  gouty arthritis becomes a frequent problem, allopurinol and probenecid may also be prescribed to prevent damaging deposits of uric acid in the joints.   How long will the effects last?   The sooner treatment is started, the sooner the symptoms stop, which may be within 24 to 48 hours. If gout is not treated, it could last a few days to several weeks. A second attack may occur, but usually not for 6 months to 2 years. In other cases another attack may not occur until many years later, or never.   How can I help prevent gout?   There is no sure way to prevent gout. However, you can take these steps to lessen the chance that you will have high uric acid levels:     Eat a diet low in purines. Purine-containing foods that you should avoid include meat--especially red meat and organ meats (such as sweetbreads, liver, and kidney)--shrimp, anchovies, sardines, and dried legumes (beans).     Do not overindulge in alcohol. Do not drink more than 2 ounces a day.     Drink lots of fluids.                      Follow-ups after your visit        Future tests that were ordered for you today     Open Future Orders        Priority Expected Expires Ordered    XR Toe Right G/E 2 Views Routine 9/25/2018 9/25/2019 9/25/2018            Who to contact     If you have questions or need follow up information about today's clinic visit or your schedule please contact [facility] directly at [phone].  Normal or non-critical lab and imaging results will be communicated to you by MyChart, letter or phone within 4 business days after the clinic has received the results. If you do not hear from us within 7 days, please contact the clinic through [employer] or phone. If you have a critical or abnormal lab result, we will notify you by phone as soon as possible.  Submit refill requests through [employer] or call your pharmacy and they will forward the refill request to us. Please allow 3 business days for your refill to be completed.     "      Additional Information About Your Visit        [employer] Information     [employer] gives you secure access to your electronic health record. If you see a primary care provider, you can also send messages to your care team and make appointments. If you have questions, please call your primary care clinic.  If you do not have a primary care provider, please call [phone] and they will assist you.        Care EveryWhere ID     This is your Care EveryWhere ID. This could be used by other organizations to access your [place] medical records  [id number]        Your Vitals Were     Pulse Temperature Height Pulse Oximetry BMI (Body Mass Index)       57 97.3  F (36.3  C) (Oral) 5' 11\" (1.803 m) 98% 32.08 kg/m2        Blood Pressure from Last 3 Encounters:   09/25/18 132/80   08/09/18 124/76   01/11/18 136/88    Weight from Last 3 Encounters:   09/25/18 230 lb (104.3 kg)   08/09/18 226 lb (102.5 kg)   01/11/18 237 lb 9.6 oz (107.8 kg)              We Performed the Following     ESR: Erythrocyte sedimentation rate     Uric acid          Today's Medication Changes          These changes are accurate as of 9/25/18  1:15 PM.  If you have any questions, ask your nurse or doctor.               Start taking these medicines.        Dose/Directions    indomethacin 50 MG capsule   Commonly known as:  INDOCIN   Used for:  Great toe pain, right, Acute gouty arthritis   Started by:  Byron Modi PA-C        Dose:  50 mg   Take 1 capsule (50 mg) by mouth 3 times daily (with meals)   Quantity:  42 capsule   Refills:  0            Where to get your medicines      These medications were sent to [facility] [address] [facility] [address]  [address], [place] [address], [facility] [address]     Phone:  [phone]     indomethacin 50 MG capsule                Primary Care Provider Office Phone # Fax #    Byron Modi PA-C [phone] [address] [facility] [address]      "   Equal Access to Services     [facility] : Hadii aad ku hadmelinaradha Ingrisgeeta, wacarlos eduardoda luqadaha, qapennyta kafloramichelle ortega, estrellita smith. So [facility] [phone].    ATENCIÓN: Si habla español, tiene a nelson disposición servicios gratuitos de asistencia lingüística. Harlaname al [phone].    We comply with applicable federal civil rights laws and [place] laws. We do not discriminate on the basis of race, color, national origin, age, disability, sex, sexual orientation, or gender identity.            Thank you!     Thank you for [facility]  [facility]. Our goal is always to provide you with excellent care. Hearing back from our patients is one way we can continue to improve our services. Please take a few minutes to complete the written survey that you may receive in the mail after your visit with us. Thank you!             Your Updated Medication List - Protect others around you: Learn how to safely use, store and throw away your medicines at www.disposemymeds.org.          This list is accurate as of 9/25/18  1:15 PM.  Always use your most recent med list.                   Brand Name Dispense Instructions for use Diagnosis    fluticasone 50 MCG/ACT spray    FLONASE    1 g    Spray 1-2 sprays into both nostrils daily    Dysfunction of right eustachian tube       IBUPROFEN PO      Take 200 mg by mouth 3 times daily 400 to 600 mg        indomethacin 50 MG capsule    INDOCIN    42 capsule    Take 1 capsule (50 mg) by mouth 3 times daily (with meals)    Great toe pain, right, Acute gouty arthritis       tadalafil 20 MG tablet    CIALIS    12 tablet    Take 0.5-1 tablets (10-20 mg) by mouth daily as needed for erectile dysfunction Never use with nitroglycerin, terazosin or doxazosin.    ED (erectile dysfunction)

## 2018-09-26 NOTE — PROGRESS NOTES
Mr. Munoz,    X-ray look normal.     Please contact the clinic if you have additional questions.  Thank you.    Sincerely,    Byron Modi PA-C

## 2018-11-15 ENCOUNTER — OFFICE VISIT (OUTPATIENT)
Dept: DERMATOLOGY | Facility: CLINIC | Age: 53
End: 2018-11-15
Attending: PHYSICIAN ASSISTANT
Payer: COMMERCIAL

## 2018-11-15 DIAGNOSIS — L57.0 AK (ACTINIC KERATOSIS): ICD-10-CM

## 2018-11-15 DIAGNOSIS — L82.1 SEBORRHEIC KERATOSIS: ICD-10-CM

## 2018-11-15 DIAGNOSIS — L81.4 SOLAR LENTIGO: ICD-10-CM

## 2018-11-15 DIAGNOSIS — D18.01 CHERRY ANGIOMA: ICD-10-CM

## 2018-11-15 DIAGNOSIS — L57.8 SUN-DAMAGED SKIN: ICD-10-CM

## 2018-11-15 DIAGNOSIS — D22.9 MULTIPLE BENIGN NEVI: ICD-10-CM

## 2018-11-15 DIAGNOSIS — D48.5 NEOPLASM OF UNCERTAIN BEHAVIOR OF SKIN: Primary | ICD-10-CM

## 2018-11-15 DIAGNOSIS — L73.8 SENILE SEBACEOUS GLAND HYPERPLASIA: ICD-10-CM

## 2018-11-15 DIAGNOSIS — Z80.8 FAMILY HISTORY OF MELANOMA: ICD-10-CM

## 2018-11-15 PROCEDURE — 17003 DESTRUCT PREMALG LES 2-14: CPT | Performed by: DERMATOLOGY

## 2018-11-15 PROCEDURE — 17000 DESTRUCT PREMALG LESION: CPT | Performed by: DERMATOLOGY

## 2018-11-15 PROCEDURE — 11100 HC BIOPSY SKIN/SUBQ/MUC MEM, SINGLE LESION: CPT | Mod: 59 | Performed by: DERMATOLOGY

## 2018-11-15 PROCEDURE — 99203 OFFICE O/P NEW LOW 30 MIN: CPT | Mod: 25 | Performed by: DERMATOLOGY

## 2018-11-15 PROCEDURE — 88305 TISSUE EXAM BY PATHOLOGIST: CPT | Mod: TC | Performed by: DERMATOLOGY

## 2018-11-15 ASSESSMENT — PAIN SCALES - GENERAL: PAINLEVEL: NO PAIN (0)

## 2018-11-15 NOTE — PATIENT INSTRUCTIONS
Cryotherapy    What is it?    Use of a very cold liquid, such as liquid nitrogen, to freeze and destroy abnormal skin cells that need to be removed    What should I expect?    Tenderness and redness    A small blister that might grow and fill with dark purple blood. There may be crusting.    More than one treatment may be needed if the lesions do not go away.    How do I care for the treated area?    Gently wash the area with your hands when bathing.    Use a thin layer of Vaseline to help with healing. You may use a Band-Aid.     The area should heal within 7-10 days and may leave behind a pink or lighter color.     Do not use an antibiotic or Neosporin ointment.     You may take acetaminophen (Tylenol) for pain.     Call your Doctor if you have:    Severe pain    Signs of infection (warmth, redness, cloudy yellow drainage, and or a bad smell)    Questions or concerns    Who should I call with questions?       [facility]: [phone]       [facility]: [phone]       For urgent needs outside of business hours call the [facility] at [phone]        and ask for the dermatology resident on call  Wound Care After a Biopsy    What is a skin biopsy?  A skin biopsy allows the doctor to examine a very small piece of tissue under the microscope to determine the diagnosis and the best treatment for the skin condition. A local anesthetic (numbing medicine)  is injected with a very small needle into the skin area to be tested. A small piece of skin is taken from the area. Sometimes a suture (stitch) is used.     What are the risks of a skin biopsy?  I will experience scar, bleeding, swelling, pain, crusting and redness. I may experience incomplete removal or recurrence. Risks of this procedure are excessive bleeding, bruising, infection, nerve damage, numbness, thick (hypertrophic or keloidal) scar and non-diagnostic biopsy.    How should I care for  my wound for the first 24 hours?    Keep the wound dry and covered for 24 hours    If it bleeds, hold direct pressure on the area for 15 minutes. If bleeding does not stop then go to the emergency room    Avoid strenuous exercise the first 1-2 days or as your doctor instructs you    How should I care for the wound after 24 hours?    After 24 hours, remove the bandage    You may bathe or shower as normal    If you had a scalp biopsy, you can shampoo as usual and can use shower water to clean the biopsy site daily    Clean the wound twice a day with gentle soap and water    Do not scrub, be gentle    Apply white petroleum/Vaseline after cleaning the wound with a cotton swab or a clean finger, and keep the site covered with a Bandaid /bandage. Bandages are not necessary with a scalp biopsy    If you are unable to cover the site with a Bandaid /bandage, re-apply ointment 2-3 times a day to keep the site moist. Moisture will help with healing    Avoid strenuous activity for first 1-2 days    Avoid lakes, rivers, pools, and oceans until the stitches are removed or the site is healed    How do I clean my wound?    Wash hands thoroughly with soap or use hand  before all wound care    Clean the wound with gentle soap and water    Apply white petroleum/Vaseline  to wound after it is clean    Replace the Bandaid /bandage to keep the wound covered for the first few days or as instructed by your doctor    If you had a scalp biopsy, warm shower water to the area on a daily basis should suffice    What should I use to clean my wound?     Cotton-tipped applicators (Qtips )    White petroleum jelly (Vaseline ). Use a clean new container and use Q-tips to apply.    Bandaids   as needed    Gentle soap     How should I care for my wound long term?    Do not get your wound dirty    Keep up with wound care for one week or until the area is healed.    A small scab will form and fall off by itself when the area is completely  healed. The area will be red and will become pink in color as it heals. Sun protection is very important for how your scar will turn out. Sunscreen with an SPF 30 or greater is recommended once the area is healed.    You should have some soreness but it should be mild and slowly go away over several days. Talk to your doctor about using tylenol for pain,    When should I call my doctor?  If you have increased:     Pain or swelling    Pus or drainage (clear or slightly yellow drainage is ok)    Temperature over 100F    Spreading redness or warmth around wound    When will I hear about my results?  The biopsy results can take 2-3 weeks to come back. The clinic will call you with the results, send you a Pharmly message, or have you schedule a follow-up clinic or phone time to discuss the results. Contact our clinics if you do not hear from us in 3 weeks.     Who should I call with questions?    [facility]: [phone]     [facility]: [phone]    For urgent needs outside of business hours call the [facility] at [phone] and ask for the dermatology resident on call

## 2018-11-15 NOTE — PROGRESS NOTES
[facility] Dermatology Note      Dermatology Problem List:  1. Family history of melanoma (father, ; also aunt and uncle on father's side)  2. NUB, right lateral neck Ddx: BCC vs telangiectasia vs dermal nevus  -s/p biopsy 11/15/2018  3. Actinic Keratosis  -s/p cryotherapy     Encounter Date: Nov 15, 2018    CC:  Chief Complaint   Patient presents with     Skin Check     Family history of melanoma - father - lesion on right flank         History of Present Illness:  Mr. Dimitris Munoz is a 53 year old male with family history of melanoma who presents as a referral from Dr. Modi for a skin check. He has a lesion of concern on his right flank. He has seen it grow and change is concerned about it. His father passed away from metastatic melanoma at the age of 54. He also had an aunt and uncle on his father's side with melanoma history. Patient had skin checks when he lived in [place] and has 2 skin checks through the [employer] system, most recent was 10 years ago at least. He spends time outside, and does some sun protection. He has a few sunprotective shirts. He also wears sunscreen when he knows he is going to be outdoors. He spent a lot of time outdoors as a child. No personal history of skin cancer. No other concerns addressed today.      Past Medical History:   Patient Active Problem List   Diagnosis     CARDIOVASCULAR SCREENING; LDL GOAL LESS THAN 160     High triglycerides     FH: melanoma     Past Medical History:   Diagnosis Date     CARDIOVASCULAR SCREENING; LDL GOAL LESS THAN 160      Past Surgical History:   Procedure Laterality Date     COLONOSCOPY WITH CO2 INSUFFLATION N/A 2015    Procedure: COLONOSCOPY WITH CO2 INSUFFLATION;  Surgeon: Alfredo Kowalski MD;  Location: [facility] OR     HERNIA REPAIR         Social History:  Patient  reports that he has never smoked. He has never used smokeless tobacco. He reports that he drinks alcohol. He reports that he does not use  illicit drugs. Patient works as an , rides bike to work.     Family History:  Family History   Problem Relation Age of Onset     Cancer Father 53     skin cancer   His paternal aunt and uncle have history of NMSC.     Medications:  Current Outpatient Prescriptions   Medication Sig Dispense Refill     fluticasone (FLONASE) 50 MCG/ACT spray Spray 1-2 sprays into both nostrils daily 1 g 11     IBUPROFEN PO Take 200 mg by mouth 3 times daily 400 to 600 mg       indomethacin (INDOCIN) 50 MG capsule Take 1 capsule (50 mg) by mouth 3 times daily (with meals) 42 capsule 0     tadalafil (CIALIS) 20 MG tablet Take 0.5-1 tablets (10-20 mg) by mouth daily as needed for erectile dysfunction Never use with nitroglycerin, terazosin or doxazosin. 12 tablet 11       No Known Allergies    Review of Systems:  -Constitutional: Patient is otherwise feeling well, in usual state of health.   -Skin: As above in HPI. No additional skin concerns.    Physical exam:  Vitals: There were no vitals taken for this visit.  GEN: This is a well developed, well-nourished male in no acute distress, in a pleasant mood.    SKIN: Total skin excluding the undergarment areas was performed. The exam included the head/face, neck, both arms, chest, back, abdomen, both legs, digits and/or nails and buttocks.   - Gay Type II  - Scattered brown macules on sun exposed areas.  - gritty pink papules on the right forehead x 2, right cheek  - There is a yellow oily papule with central umbilication located on the left temple.  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   - Multiple regular brown pigmented macules and papules are identified on the face and trunk.   - There are waxy stuck on tan to brown papules on the trunk.  - No other lesions of concern on areas examined.           Impression/Plan:  1. Family history of melanoma:  father, aunt, uncle.     Recommended yearly skin checks due to family history.    Recommended his children get skin checks as well.    Recommend sunscreens SPF #30 or greater, protective clothing and avoidance of tanning beds.     2. Sun damaged skin with solar lentigines with changes of poikiloderma of civatte    Sun precaution was advised including the use of sun screens of SPF 30 or higher, sun protective clothing, and avoidance of tanning beds.    3. 9 mm pink pearly papule with arborizing vessels, right lateral neck. Neoplasm of uncertain behavior, ddx:  BCC vs telangiectasia vs dermal nevus    Shave biopsy:  After discussion of benefits and risks including but not limited to bleeding/bruising, pain/swelling, infection, scar, incomplete removal, nerve damage/numbness, recurrence, and non-diagnostic biopsy, written consent, verbal consent and photographs were obtained. Time-out was performed. The area was cleaned with isopropyl alcohol.  was injected to obtain adequate anesthesia of the lesion on the right lateral neck. 0.5ml of 1% lidocaine with 1:100,000 epinephrine was injected to obtain adequate anesthesia. A  shave biopsy was performed. Hemostasis was achieved with aluminium chloride. Vaseline and a sterile dressing were applied. The patient tolerated the procedure and no complications were noted. The patient was provided with verbal and written post care instructions.     4. Benign findings including: cherry angiomas, sebaceous hyperplasia, seborrheic keratoses    No further intervention required. Patient to report changes.     Patient reassured of the benign nature of these lesions.    5. Multiple clinically benign nevi    No further intervention required. Patient to report changes.     Patient reassured of the benign nature of these lesions.    6. Actinic keratosis. Discussed precancerous nature of these lesions. Recommended treatment to prevent progression to a squamous cell carcinoma.     Cryotherapy  procedure note: After verbal consent and discussion of risks and benefits including but no limited to dyspigmentation/scar, blister, and pain, 3 was(were) treated with 1-2mm freeze border for 2 cycles with liquid nitrogen. Post cryotherapy instructions were provided.     Follow-up 1 year for skin exam, sooner for lesions of concern.        Staff Involved:  Scribe/Staff    Scribe Disclosure  I, Carline Bender, am serving as a scribe to document services personally performed by Dr. Jen Vega MD, based on data collection and the provider's statements to me.     Provider Disclosure:   The documentation recorded by the scribe accurately reflects the services I personally performed and the decisions made by me.    Jen Vega MD    Department of Dermatology  [facility] [place]: Phone: [phone], Fax:[phone]  [facility]: Phone: [phone], Fax: [phone]

## 2018-11-15 NOTE — MR AVS SNAPSHOT
After Visit Summary   11/15/2018    Dimitris Munoz    MRN: [id number]           Patient Information     Date Of Birth          1965        Visit Information        Provider Department      11/15/2018 8:45 AM Jen Vega MD [facility]        Today's Diagnoses     Neoplasm of uncertain behavior of skin    -  1    Sun-damaged skin        Solar lentigo        Family history of melanoma        AK (actinic keratosis)        Multiple benign nevi        Cherry angioma        Senile sebaceous gland hyperplasia        Seborrheic keratosis          Care Instructions    Cryotherapy    What is it?    Use of a very cold liquid, such as liquid nitrogen, to freeze and destroy abnormal skin cells that need to be removed    What should I expect?    Tenderness and redness    A small blister that might grow and fill with dark purple blood. There may be crusting.    More than one treatment may be needed if the lesions do not go away.    How do I care for the treated area?    Gently wash the area with your hands when bathing.    Use a thin layer of Vaseline to help with healing. You may use a Band-Aid.     The area should heal within 7-10 days and may leave behind a pink or lighter color.     Do not use an antibiotic or Neosporin ointment.     You may take acetaminophen (Tylenol) for pain.     Call your Doctor if you have:    Severe pain    Signs of infection (warmth, redness, cloudy yellow drainage, and or a bad smell)    Questions or concerns    Who should I call with questions?       [facility]: [phone]       [facility]: [phone]       For urgent needs outside of business hours call the [facility] at [phone]        and ask for the dermatology resident on call  Wound Care After a Biopsy    What is a skin biopsy?  A skin biopsy allows the doctor to examine a very small piece of tissue under the microscope to  determine the diagnosis and the best treatment for the skin condition. A local anesthetic (numbing medicine)  is injected with a very small needle into the skin area to be tested. A small piece of skin is taken from the area. Sometimes a suture (stitch) is used.     What are the risks of a skin biopsy?  I will experience scar, bleeding, swelling, pain, crusting and redness. I may experience incomplete removal or recurrence. Risks of this procedure are excessive bleeding, bruising, infection, nerve damage, numbness, thick (hypertrophic or keloidal) scar and non-diagnostic biopsy.    How should I care for my wound for the first 24 hours?    Keep the wound dry and covered for 24 hours    If it bleeds, hold direct pressure on the area for 15 minutes. If bleeding does not stop then go to the emergency room    Avoid strenuous exercise the first 1-2 days or as your doctor instructs you    How should I care for the wound after 24 hours?    After 24 hours, remove the bandage    You may bathe or shower as normal    If you had a scalp biopsy, you can shampoo as usual and can use shower water to clean the biopsy site daily    Clean the wound twice a day with gentle soap and water    Do not scrub, be gentle    Apply white petroleum/Vaseline after cleaning the wound with a cotton swab or a clean finger, and keep the site covered with a Bandaid /bandage. Bandages are not necessary with a scalp biopsy    If you are unable to cover the site with a Bandaid /bandage, re-apply ointment 2-3 times a day to keep the site moist. Moisture will help with healing    Avoid strenuous activity for first 1-2 days    Avoid lakes, rivers, pools, and oceans until the stitches are removed or the site is healed    How do I clean my wound?    Wash hands thoroughly with soap or use hand  before all wound care    Clean the wound with gentle soap and water    Apply white petroleum/Vaseline  to wound after it is clean    Replace the  Bandaid /bandage to keep the wound covered for the first few days or as instructed by your doctor    If you had a scalp biopsy, warm shower water to the area on a daily basis should suffice    What should I use to clean my wound?     Cotton-tipped applicators (Qtips )    White petroleum jelly (Vaseline ). Use a clean new container and use Q-tips to apply.    Bandaids   as needed    Gentle soap     How should I care for my wound long term?    Do not get your wound dirty    Keep up with wound care for one week or until the area is healed.    A small scab will form and fall off by itself when the area is completely healed. The area will be red and will become pink in color as it heals. Sun protection is very important for how your scar will turn out. Sunscreen with an SPF 30 or greater is recommended once the area is healed.    You should have some soreness but it should be mild and slowly go away over several days. Talk to your doctor about using tylenol for pain,    When should I call my doctor?  If you have increased:     Pain or swelling    Pus or drainage (clear or slightly yellow drainage is ok)    Temperature over 100F    Spreading redness or warmth around wound    When will I hear about my results?  The biopsy results can take 2-3 weeks to come back. The clinic will call you with the results, send you a Sirit message, or have you schedule a follow-up clinic or phone time to discuss the results. Contact our clinics if you do not hear from us in 3 weeks.     Who should I call with questions?    [facility]: [phone]     [facility]: [phone]    For urgent needs outside of business hours call the [facility] at [phone] and ask for the dermatology resident on call              Follow-ups after your visit        Who to contact     If you have questions or need follow up information about today's clinic visit or your schedule please  contact [facility] directly at [phone].  Normal or non-critical lab and imaging results will be communicated to you by [employer]hart, letter or phone within 4 business days after the clinic has received the results. If you do not hear from us within 7 days, please contact the clinic through [employer]hart or phone. If you have a critical or abnormal lab result, we will notify you by phone as soon as possible.  Submit refill requests through [employer] or call your pharmacy and they will forward the refill request to us. Please allow 3 business days for your refill to be completed.          Additional Information About Your Visit        [employer] Information     [employer] gives you secure access to your electronic health record. If you see a primary care provider, you can also send messages to your care team and make appointments. If you have questions, please call your primary care clinic.  If you do not have a primary care provider, please call [phone] and they will assist you.      [employer] is an electronic gateway that provides easy, online access to your medical records. With [employer], you can request a clinic appointment, read your test results, renew a prescription or communicate with your care team.     To access your existing account, please contact your [facility] or call [phone] for assistance.        Care EveryWhere ID     This is your Care EveryWhere ID. This could be used by other organizations to access your [facility] medical records  [id number]         Blood Pressure from Last 3 Encounters:   09/25/18 132/80   08/09/18 124/76   01/11/18 136/88    Weight from Last 3 Encounters:   09/25/18 104.3 kg (230 lb)   08/09/18 102.5 kg (226 lb)   01/11/18 107.8 kg (237 lb 9.6 oz)              We Performed the Following     BIOPSY SKIN/SUBQ/MUC MEM, SINGLE LESION     Dermatological path order and indications     DESTRUCT PREMALIGNANT LESION, 2-14     DESTRUCT  PREMALIGNANT LESION, FIRST        Primary Care Provider Office Phone # Fax #    Byron Elfego Modi PA-C [phone] [address] [facility] [address]        Equal Access to Services     MANJIT ALMANZAR : Hadii doug ku lindao Soomaali, waaxda luqadaha, qaybta kaalmada adeshayyyada, estrellita reynoldsn feliz hinson laKielbernice smith. So [facility] [phone].    ATENCIÓN: Si habla español, tiene a nelson disposición servicios gratuitos de asistencia lingüística. Llame al [phone].    We comply with applicable federal civil rights laws and [place] laws. We do not discriminate on the basis of race, color, national origin, age, disability, sex, sexual orientation, or gender identity.            Thank you!     Thank you for choosing [facility]  for your care. Our goal is always to provide you with excellent care. Hearing back from our patients is one way we can continue to improve our services. Please take a few minutes to complete the written survey that you may receive in the mail after your visit with us. Thank you!             Your Updated Medication List - Protect others around you: Learn how to safely use, store and throw away your medicines at www.disposemymeds.org.          This list is accurate as of 11/15/18  8:59 AM.  Always use your most recent med list.                   Brand Name Dispense Instructions for use Diagnosis    fluticasone 50 MCG/ACT spray    FLONASE    1 g    Spray 1-2 sprays into both nostrils daily    Dysfunction of right eustachian tube       IBUPROFEN PO      Take 200 mg by mouth 3 times daily 400 to 600 mg        indomethacin 50 MG capsule    INDOCIN    42 capsule    Take 1 capsule (50 mg) by mouth 3 times daily (with meals)    Great toe pain, right, Acute gouty arthritis       tadalafil 20 MG tablet    CIALIS    12 tablet    Take 0.5-1 tablets (10-20 mg) by mouth daily as needed for erectile dysfunction Never use with nitroglycerin, terazosin or doxazosin.    ED  (erectile dysfunction)

## 2018-11-15 NOTE — NURSING NOTE
The following medication was given:     MEDICATION:  Lidocaine with epinephrine 1% [id number]  ROUTE: SQ  SITE: see procedure note  DOSE: 1.5 cc  LOT #: -[id number]  : Derek  EXPIRATION DATE: 09-19  NDC#: 8402-6799-14   Was there drug waste? No  Multi-dose vial: Yes    Quynh Desouza LPN  November 15, 2018

## 2018-11-15 NOTE — LETTER
11/15/2018         RE: Dimitris Munoz  [address] [address]        Dear Colleague,    Thank you for referring your patient, Dimitris Munoz, to the [facility]. Please see a copy of my visit note below.    [facility] Dermatology Note      Dermatology Problem List:  1. Family history of melanoma (father, ; also aunt and uncle on father's side)  2. NUB, right lateral neck Ddx: BCC vs telangiectasia vs dermal nevus  -s/p biopsy 11/15/2018  3. Actinic Keratosis  -s/p cryotherapy     Encounter Date: Nov 15, 2018    CC:  Chief Complaint   Patient presents with     Skin Check     Family history of melanoma - father - lesion on right flank         History of Present Illness:  Mr. Dimitris Munoz is a 53 year old male with family history of melanoma who presents as a referral from Dr. Modi for a skin check. He has a lesion of concern on his right flank. He has seen it grow and change is concerned about it. His father passed away from metastatic melanoma at the age of 54. He also had an aunt and uncle on his father's side with melanoma history. Patient had skin checks when he lived in [place] and has 2 skin checks through the [employer] system, most recent was 10 years ago at least. He spends time outside, and does some sun protection. He has a few sunprotective shirts. He also wears sunscreen when he knows he is going to be outdoors. He spent a lot of time outdoors as a child. No personal history of skin cancer. No other concerns addressed today.      Past Medical History:   Patient Active Problem List   Diagnosis     CARDIOVASCULAR SCREENING; LDL GOAL LESS THAN 160     High triglycerides     FH: melanoma     Past Medical History:   Diagnosis Date     CARDIOVASCULAR SCREENING; LDL GOAL LESS THAN 160      Past Surgical History:   Procedure Laterality Date     COLONOSCOPY WITH CO2 INSUFFLATION N/A 2015    Procedure: COLONOSCOPY WITH CO2 INSUFFLATION;   Surgeon: Alfredo Kowalski MD;  Location: [facility] OR     HERNIA REPAIR         Social History:  Patient  reports that he has never smoked. He has never used smokeless tobacco. He reports that he drinks alcohol. He reports that he does not use illicit drugs. Patient works as an , rides bike to work.     Family History:  Family History   Problem Relation Age of Onset     Cancer Father 53     skin cancer   His paternal aunt and uncle have history of NMSC.     Medications:  Current Outpatient Prescriptions   Medication Sig Dispense Refill     fluticasone (FLONASE) 50 MCG/ACT spray Spray 1-2 sprays into both nostrils daily 1 g 11     IBUPROFEN PO Take 200 mg by mouth 3 times daily 400 to 600 mg       indomethacin (INDOCIN) 50 MG capsule Take 1 capsule (50 mg) by mouth 3 times daily (with meals) 42 capsule 0     tadalafil (CIALIS) 20 MG tablet Take 0.5-1 tablets (10-20 mg) by mouth daily as needed for erectile dysfunction Never use with nitroglycerin, terazosin or doxazosin. 12 tablet 11       No Known Allergies    Review of Systems:  -Constitutional: Patient is otherwise feeling well, in usual state of health.   -Skin: As above in HPI. No additional skin concerns.    Physical exam:  Vitals: There were no vitals taken for this visit.  GEN: This is a well developed, well-nourished male in no acute distress, in a pleasant mood.    SKIN: Total skin excluding the undergarment areas was performed. The exam included the head/face, neck, both arms, chest, back, abdomen, both legs, digits and/or nails and buttocks.   - Gay Type II  - Scattered brown macules on sun exposed areas.  - gritty pink papules on the right forehead x 2, right cheek  - There is a yellow oily papule with central umbilication located on the left temple.  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   - Multiple regular  brown pigmented macules and papules are identified on the face and trunk.   - There are waxy stuck on tan to brown papules on the trunk.  - No other lesions of concern on areas examined.           Impression/Plan:  1. Family history of melanoma: father, aunt, uncle.     Recommended yearly skin checks due to family history.    Recommended his children get skin checks as well.    Recommend sunscreens SPF #30 or greater, protective clothing and avoidance of tanning beds.     2. Sun damaged skin with solar lentigines with changes of poikiloderma of civatte    Sun precaution was advised including the use of sun screens of SPF 30 or higher, sun protective clothing, and avoidance of tanning beds.    3. 9 mm pink pearly papule with arborizing vessels, right lateral neck. Neoplasm of uncertain behavior, ddx:  BCC vs telangiectasia vs dermal nevus    Shave biopsy:  After discussion of benefits and risks including but not limited to bleeding/bruising, pain/swelling, infection, scar, incomplete removal, nerve damage/numbness, recurrence, and non-diagnostic biopsy, written consent, verbal consent and photographs were obtained. Time-out was performed. The area was cleaned with isopropyl alcohol.  was injected to obtain adequate anesthesia of the lesion on the right lateral neck. 0.5ml of 1% lidocaine with 1:100,000 epinephrine was injected to obtain adequate anesthesia. A  shave biopsy was performed. Hemostasis was achieved with aluminium chloride. Vaseline and a sterile dressing were applied. The patient tolerated the procedure and no complications were noted. The patient was provided with verbal and written post care instructions.     4. Benign findings including: cherry angiomas, sebaceous hyperplasia, seborrheic keratoses    No further intervention required. Patient to report changes.     Patient reassured of the benign nature of these lesions.    5. Multiple clinically benign nevi    No further intervention required. Patient  to report changes.     Patient reassured of the benign nature of these lesions.    6. Actinic keratosis. Discussed precancerous nature of these lesions. Recommended treatment to prevent progression to a squamous cell carcinoma.     Cryotherapy procedure note: After verbal consent and discussion of risks and benefits including but no limited to dyspigmentation/scar, blister, and pain, 3 was(were) treated with 1-2mm freeze border for 2 cycles with liquid nitrogen. Post cryotherapy instructions were provided.     Follow-up 1 year for skin exam, sooner for lesions of concern.        Staff Involved:  Scribe/Staff    Scribe Disclosure  I, Carline Bender, am serving as a scribe to document services personally performed by Dr. Jen Vega MD, based on data collection and the provider's statements to me.     Provider Disclosure:   The documentation recorded by the scribe accurately reflects the services I personally performed and the decisions made by me.    Jen Vega MD    Department of Dermatology  [facility][note 2]: Phone: [phone], Fax:[phone]  [facility]: Phone: [phone], Fax: [phone]              Again, thank you for allowing me to participate in the care of your patient.        Sincerely,        Jen Vega MD

## 2018-12-13 ENCOUNTER — OFFICE VISIT (OUTPATIENT)
Dept: DERMATOLOGY | Facility: CLINIC | Age: 53
End: 2018-12-13
Payer: COMMERCIAL

## 2018-12-13 VITALS — SYSTOLIC BLOOD PRESSURE: 147 MMHG | HEART RATE: 62 BPM | DIASTOLIC BLOOD PRESSURE: 76 MMHG | OXYGEN SATURATION: 99 %

## 2018-12-13 DIAGNOSIS — C44.41 BASAL CELL CARCINOMA (BCC) OF RIGHT SIDE OF NECK: Primary | ICD-10-CM

## 2018-12-13 PROCEDURE — 88305 TISSUE EXAM BY PATHOLOGIST: CPT | Mod: TC | Performed by: DERMATOLOGY

## 2018-12-13 PROCEDURE — 11622 EXC S/N/H/F/G MAL+MRG 1.1-2: CPT | Mod: GC | Performed by: DERMATOLOGY

## 2018-12-13 PROCEDURE — 13132 CMPLX RPR F/C/C/M/N/AX/G/H/F: CPT | Mod: GC | Performed by: DERMATOLOGY

## 2018-12-13 RX ORDER — LIDOCAINE HYDROCHLORIDE AND EPINEPHRINE 10; 10 MG/ML; UG/ML
1-50 INJECTION, SOLUTION INFILTRATION; PERINEURAL ONCE
Status: COMPLETED | OUTPATIENT
Start: 2018-12-13 | End: 2018-12-13

## 2018-12-13 RX ADMIN — LIDOCAINE HYDROCHLORIDE AND EPINEPHRINE 7 ML: 10; 10 INJECTION, SOLUTION INFILTRATION; PERINEURAL at 15:57

## 2018-12-13 ASSESSMENT — PAIN SCALES - GENERAL: PAINLEVEL: NO PAIN (0)

## 2018-12-13 NOTE — NURSING NOTE
Vaseline and pressure dressing applied to excision site on right lateral neck.  Wound care instructions reviewed with patient and AVS provided.  Patient verbalized understanding.  No further questions or concerns at this time.    Korin Shafer LPN

## 2018-12-13 NOTE — NURSING NOTE
Do you take the following medications:  Aspirin:  NO  Ibuprofen/Advil/Motrin, Aleve/Naproxen:   YES, notified to discontinue as of today.  Coumadin, Eliquis, Pradaxa, Xarelto:   NO  Vitamin E:   NO  Plavix:   NO   Golden Triangle's wart: NO   Garlic supplement:  NO   Fish Oil: NO  Antibiotic Prophylaxis:  NO    Do you have an ALLERGIC REACTION to any medications:  NO   Review of patient's allergies indicates no known allergies.    Past Medical History                                                  Do you currently or have you previously had any of the following conditions:     HIV/AIDS:  NO  Hepatitis:  NO  Suppressed Immune System:  NO  Autoimmune disorder (eg RA, Lupus):  NO  Prolonged bleeding or bleeding disorder:  NO  Fever blisters/herpes, cold sores:  NO  Kidney disease:  NO  Creatinine       Date                     Value               Ref Range           Status                08/09/2018               1.26 (H)            0.66 - 1.25 mg*     Final            ----------]  Pacemaker or Defibrillator:  NO.    History of artificial or heart valve replacement:  NO.    Endocarditis: NO  Organ transplant: NO.    Joint replacement within past 2 years: NO  Previous prosthetic joint infection: N/A  Diabetes: NO  Pregnant:: N/A  Keloids or Abnormal scars: NO  Mobility device (wheelchair, transfer difficulty): NO      Korin Shafer LPN

## 2018-12-13 NOTE — PROGRESS NOTES
DERMATOLOGIC EXCISION SURGERY PROCEDURE NOTE   Dermatology Problem List:  1. Family history of melanoma (father, ; also aunt and uncle on father's side)  2. BCC, right lateral neck - s/p excision 18   3. Actinic Keratosis - s/p cryotherapy     NAME OF PROCEDURE: Excision with complex linear closure  Staff surgeon: Cali Mcguire DO  Resident: Barbara Bean MD  Scrub nurse: Korin    PRE-OPERATIVE DIAGNOSIS:  Basal Cell Carcinoma  POST-OPERATIVE DIAGNOSIS: Same   LOCATION: Right lateral neck  FINAL EXCISION SIZE(DEFECT SIZE): 2.0 cm  MARGIN: 0.4 cm  FINAL REPAIR LENGTH: 5.2 cm   ANESTHESIA:  1% lidocaine with 1:100,000 epinephrine    INDICATIONS: This patient presented with a 1.2cm Basal Cell Carcinoma.. Excision was indicated.   We discussed the principles of treatment and most likely complications including bleeding, infection, scarring, alteration in skin color and sensation, wound dehiscence,muscle weakness in the area, or recurrence of the lesion or disease. We reviewed that on occasion, after healing, a secondary procedure or revision may be recommended in order to obtain the best cosmetic or functional result.   PROCEDURE: The patient was taken to the operative suite. Time-out was performed. The treatment area was anesthetized. The area was washed with Hibiclens, rinsed with saline and draped with sterile towels. The lesion was delineated and excised down to deep subcutaneous fat in a fusiform manner. Hemostasis was obtained by electrocoagulation.     REPAIR: A complex layered linear closure was selected as the procedure which would maximally preserve both function and cosmesis and for the following reasons: 1) the defect was widely undermined; 2) multiple deep plication and layered sutures placed; 3) wound size, depth, tension, and location.   After the excision of the tumor, the area was extensively and carefully undermined using blunt Metzenbaum scissors. Hemostasis was obtained with spot  electrocautery and ligation of vessels where necessary. An initial deep plication sutures of 4-0 Vicryl sutures  were placed in the deep, subcutaneous and fascial planes to appose the lateral margins.  The subcutaneous and dermal layers were then closed with additional Vicryl sutures. The epidermis was then carefully approximated along the length of the wound using chromic gut running sutures.    Estimated blood loss was less than 10 ml for all surgical sites. A sterile pressure dressing was applied and wound care instructions, with a written handout, were given. The patient was discharged from the [facility] alert and ambulatory.    Follow up in Dermatology Clinic for wound check as needed.     Dr. Mcguire staffed the patient and was present for the key portions of the above procedure.     Staff Involved:  Resident/Staff     Barbara Bean MD  PGY-2, Dermatology  Staff Physician Comments:   I saw and evaluated the patient with the resident (Dr. Barbara Bean) and I agree with the above description of the procedure.   I was physically present for all key portions of the procedure today. I was immediately available at all times.    Cali Mcguire DO    Department of Dermatology  [facility]: Phone: [phone], Fax:[phone]  [facility]: Phone: [phone], Fax: [phone]

## 2018-12-17 LAB
COPATH REPORT: NORMAL
COPATH REPORT: NORMAL

## 2018-12-20 ENCOUNTER — TELEPHONE (OUTPATIENT)
Dept: DERMATOLOGY | Facility: CLINIC | Age: 53
End: 2018-12-20

## 2018-12-20 ENCOUNTER — OFFICE VISIT (OUTPATIENT)
Dept: DERMATOLOGY | Facility: CLINIC | Age: 53
End: 2018-12-20
Payer: COMMERCIAL

## 2018-12-20 DIAGNOSIS — C44.41 BASAL CELL CARCINOMA (BCC) OF RIGHT SIDE OF NECK: Primary | ICD-10-CM

## 2018-12-20 PROCEDURE — 99024 POSTOP FOLLOW-UP VISIT: CPT | Performed by: DERMATOLOGY

## 2018-12-20 NOTE — LETTER
12/20/2018         RE: Dimitris Munoz  [address] [address] [address]        Dear Colleague,    Thank you for referring your patient, Dimitris Munoz, to the [facility]. Please see a copy of my visit note below.    [facility] Dermatology Post-operative Visit note:  Subjective: The patient follows up for a wound check after undergoing wide local excision to remove a basal cell carcinoma from the right lateral neck on 12/13/18. The resulting defect was repaired with a complex linear closure. The patient says the site has healed well since the surgery. He reports minor pain lingering, but this is relieved by tylenol. He is otherwise content with the results.   Objective: On exam, there is an appropriately healing surgical site on the right lateral neck with no purulence, tenderness or surrounding erythema. Wound edges pink, minimal crust. Non-tender to palpation. Fast Absorbing Gut Sutures intact.   Assessment and Plan: Appropriately healing surgical site without any signs of infection    Fast absorbing gut sutures were clipped from the wound.     Continue daily dressing changes and application of petroleum jelly until healed over with new pink skin; likely another few days.     Recommended sunscreens SPF #50 or greater and protective clothing.     Continue periodic self skin exams and report of any new or changing lesions.     Please refer to previous clinic note for details regarding treatment.  Follow up in June for skin exam with Dr. Vega.     Staff:    Scribe Disclosure  I, Liam Beal, am serving as a scribe to document services personally performed by Dr. Cali Mcguire DO, based on data collection and the provider's statements to me.     Provider Disclosure:   The documentation recorded by the scribe accurately reflects the services I personally performed and the decisions made by me.    Cali Mcguire DO    Department of  Dermatology  [facility]: Phone: [phone], Fax:[phone]  [facility]: Phone: [phone], Fax: [phone]    Again, thank you for allowing me to participate in the care of your patient.        Sincerely,        Cali Mcguire MD

## 2018-12-20 NOTE — TELEPHONE ENCOUNTER
Notes recorded by Korin Shafer LPN on 12/20/2018 at 8:58 AM CST  Called and informed patient of results. Patient verbalized understanding and had no further questions or concerns at this time. Patients next skin exam is scheduled for June.     Korin Shafer LPN    ------    Notes recorded by Korin Shafer LPN on 12/18/2018 at 3:40 PM CST  Left message for patient to call [facility] in [place] back at [phone]    Korin Shafer LPN    ------    Notes recorded by Cali Woods MD on 12/18/2018 at 3:34 PM CST  Please contact the patient with pathology results.   Excision margins were clear.   As long as the wound is healing well, no additional surgery is necessary.   Thank you.   Dermatological path order and indications   Order: [id number]   Status:  Final result   Visible to patient:  Yes (MyChart) Dx:  Basal cell carcinoma (BCC) of right s...   Component 7d ago   Copath Report Patient Name: MEGAN COLBERT   MR#: [id number]   Specimen #: [id number]   Collected: 12/13/2018   Received: 12/14/2018   Reported: 12/17/2018 21:44   Ordering Phy(s): CALI WOODS     For improved result formatting, select 'View Enhanced Report Format' under    Linked Documents section.     SPECIMEN(S):   Skin, right lateral neck     FINAL DIAGNOSIS:   Skin, right lateral neck:   - Scar and residual basal cell carcinoma, completely excised - see   description               Korin Shafer LPN

## 2018-12-20 NOTE — NURSING NOTE
Dimitris Munoz's goals for this visit include:   Chief Complaint   Patient presents with     RECHECK     wound check        He requests these members of his care team be copied on today's visit information: yes    PCP: Byron Modi    Referring Provider:  No referring provider defined for this encounter.    There were no vitals taken for this visit.    Do you need any medication refills at today's visit? No     Amorrkaterine Scott CMA

## 2018-12-20 NOTE — PROGRESS NOTES
[facility] Dermatology Post-operative Visit note:  Subjective: The patient follows up for a wound check after undergoing wide local excision to remove a basal cell carcinoma from the right lateral neck on 12/13/18. The resulting defect was repaired with a complex linear closure. The patient says the site has healed well since the surgery. He reports minor pain lingering, but this is relieved by tylenol. He is otherwise content with the results.   Objective: On exam, there is an appropriately healing surgical site on the right lateral neck with no purulence, tenderness or surrounding erythema. Wound edges pink, minimal crust. Non-tender to palpation. Fast Absorbing Gut Sutures intact.   Assessment and Plan: Appropriately healing surgical site without any signs of infection    Fast absorbing gut sutures were clipped from the wound.     Continue daily dressing changes and application of petroleum jelly until healed over with new pink skin; likely another few days.     Recommended sunscreens SPF #50 or greater and protective clothing.     Continue periodic self skin exams and report of any new or changing lesions.     Please refer to previous clinic note for details regarding treatment.  Follow up in June for skin exam with Dr. Vega.     Staff:    Scribe Disclosure  I, Liam Beal, am serving as a scribe to document services personally performed by Dr. Cali Mcguire DO, based on data collection and the provider's statements to me.     Provider Disclosure:   The documentation recorded by the scribe accurately reflects the services I personally performed and the decisions made by me.    Cali Mcguire DO    Department of Dermatology  [facility]: Phone: [phone], Fax:[phone]  [facility]: Phone: [phone], Fax: [phone]

## 2019-06-05 ENCOUNTER — OFFICE VISIT (OUTPATIENT)
Dept: DERMATOLOGY | Facility: CLINIC | Age: 54
End: 2019-06-05
Payer: COMMERCIAL

## 2019-06-05 DIAGNOSIS — L82.1 SEBORRHEIC KERATOSIS: ICD-10-CM

## 2019-06-05 DIAGNOSIS — D22.9 MULTIPLE BENIGN NEVI: ICD-10-CM

## 2019-06-05 DIAGNOSIS — Z85.828 HISTORY OF NONMELANOMA SKIN CANCER: Primary | ICD-10-CM

## 2019-06-05 DIAGNOSIS — D23.9 DERMATOFIBROMA: ICD-10-CM

## 2019-06-05 DIAGNOSIS — D18.01 CHERRY ANGIOMA: ICD-10-CM

## 2019-06-05 DIAGNOSIS — L57.0 ACTINIC KERATOSIS: ICD-10-CM

## 2019-06-05 DIAGNOSIS — L57.8 SUN-DAMAGED SKIN: ICD-10-CM

## 2019-06-05 DIAGNOSIS — Z80.8 FAMILY HISTORY OF MELANOMA: ICD-10-CM

## 2019-06-05 DIAGNOSIS — L81.4 SOLAR LENTIGO: ICD-10-CM

## 2019-06-05 PROCEDURE — 17000 DESTRUCT PREMALG LESION: CPT | Performed by: DERMATOLOGY

## 2019-06-05 PROCEDURE — 99214 OFFICE O/P EST MOD 30 MIN: CPT | Mod: 25 | Performed by: DERMATOLOGY

## 2019-06-05 ASSESSMENT — PAIN SCALES - GENERAL: PAINLEVEL: NO PAIN (0)

## 2019-06-05 NOTE — LETTER
2019         RE: Dimitris Munoz  [address] [address] [address]        Dear Colleague,    Thank you for referring your patient, Dimitris Munoz, to the [facility]. Please see a copy of my visit note below.    [facility] Dermatology Note      Dermatology Problem List:  1. Family history of melanoma (father, ; paternal aunt)  2. BCC, right lateral neck, s/p excision 2018  3. Actinic Keratosis, s/p cryotherapy     Encounter Date: 2019    CC:  Chief Complaint   Patient presents with     Skin Check     Hx of NMSC         History of Present Illness:  Mr. Dimitris Munzo is a 53 year old male with a history of NMSC who presents as a follow up for a skin check. Today, he reports that his scar is healing well on his neck. There is a rough spot on his face that he is concerned about. It does not hurt or bleed. His wife does a good job looking over his skin for him. They have not found any other concerning areas. Denies any tender, nonhealing, bleeding skin lesions.     No other concerns addressed today.      Past Medical History:   Patient Active Problem List   Diagnosis     CARDIOVASCULAR SCREENING; LDL GOAL LESS THAN 160     High triglycerides     FH: melanoma     Past Medical History:   Diagnosis Date     CARDIOVASCULAR SCREENING; LDL GOAL LESS THAN 160      Past Surgical History:   Procedure Laterality Date     COLONOSCOPY WITH CO2 INSUFFLATION N/A 2015    Procedure: COLONOSCOPY WITH CO2 INSUFFLATION;  Surgeon: Alfredo Kowalski MD;  Location: [facility] OR     HERNIA REPAIR         Social History:  Patient  reports that he has never smoked. He has never used smokeless tobacco. He reports that he drinks alcohol. He reports that he does not use drugs. Patient works as an , rides bike to work. Daughter going away to college this Fall.      Family History:  Father: Melanoma,   Paternal aunt: Melanoma  Paternal uncle:  BCCs    Medications:  Current Outpatient Medications   Medication Sig Dispense Refill     fluticasone (FLONASE) 50 MCG/ACT spray Spray 1-2 sprays into both nostrils daily 1 g 11     IBUPROFEN PO Take 200 mg by mouth 3 times daily 400 to 600 mg       indomethacin (INDOCIN) 50 MG capsule Take 1 capsule (50 mg) by mouth 3 times daily (with meals) 42 capsule 0     tadalafil (CIALIS) 20 MG tablet Take 0.5-1 tablets (10-20 mg) by mouth daily as needed for erectile dysfunction Never use with nitroglycerin, terazosin or doxazosin. (Patient not taking: Reported on 2019) 12 tablet 11       No Known Allergies    Review of Systems:  -Constitutional: Patient is otherwise feeling well, in usual state of health.   -Skin: As above in HPI. No additional skin concerns.    Physical exam:  Vitals: There were no vitals taken for this visit.  GEN: This is a well developed, well-nourished male in no acute distress, in a pleasant mood.    SKIN: Total skin excluding the undergarment areas was performed. The exam included the head/face, neck, both arms, chest, back, abdomen, both legs, digits and/or nails and buttocks.   - Gay Type II  - Scattered brown macules on sun exposed areas.  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   - Multiple regular brown pigmented macules and papules are identified on the face and trunk.   - There are waxy stuck on tan to brown papules on the trunk.  - There is a firm tan/flesh colored papule that dimples with lateral pressure on the right anterior shin.  - No other lesions of concern on areas examined.     Impression/Plan:  1. Family history of melanoma: father () as well as paternal aunt    Recommended yearly skin checks due to family history.    Recommend sunscreens SPF #30 or greater, protective clothing and avoidance of tanning beds.     2. History of  NMSC, no evidence of recurrence     Recommend sunscreens SPF #30 or greater, protective clothing and avoidance of tanning beds.    3. Sun damaged skin with solar lentigines with changes of poikiloderma of civatte    Sun precaution was advised including the use of sun screens of SPF 30 or higher, sun protective clothing, and avoidance of tanning beds.    4. Benign findings including: cherry angiomas, dermatofibroma, seborrheic keratoses    No further intervention required. Patient to report changes.     Patient reassured of the benign nature of these lesions.    5. Multiple clinically benign nevi    No further intervention required. Patient to report changes.     Patient reassured of the benign nature of these lesions.    6. Actinic keratosis. Discussed precancerous nature of these lesions. Recommended treatment to prevent progression to a squamous cell carcinoma.     Cryotherapy procedure note: After verbal consent and discussion of risks and benefits including but no limited to dyspigmentation/scar, blister, and pain, 1 was(were) treated with 1-2mm freeze border for 2 cycles with liquid nitrogen. Post cryotherapy instructions were provided.     Follow-up 1 year for skin exam, sooner for lesions of concern.        Staff Involved:  Scribe/Staff    Scribe Disclosure  I, Carline Bender, am serving as a scribe to document services personally performed by Dr. Jen Vega MD, based on data collection and the provider's statements to me.     Provider Disclosure:   The documentation recorded by the scribe accurately reflects the services I personally performed and the decisions made by me.    Jen Vega MD    Department of Dermatology  [facility] [facility]: Phone: [phone], Fax:[phone]  [facility]: Phone: [phone], Fax: [phone]                      Again, thank you for allowing me  to participate in the care of your patient.        Sincerely,        Jen Vega MD

## 2019-06-05 NOTE — NURSING NOTE
@Dimitris Munoz's goals for this visit include:   Chief Complaint   Patient presents with     Skin Check     Hx of NMSC       He requests these members of his care team be copied on today's visit information: NO    PCP: Byron Modi    Referring Provider:  No referring provider defined for this encounter.    There were no vitals taken for this visit.    Do you need any medication refills at today's visit? NO    Muriel Ferreira CMA

## 2019-06-05 NOTE — PROGRESS NOTES
[facility] Dermatology Note      Dermatology Problem List:  1. Family history of melanoma (father, ; paternal aunt)  2. BCC, right lateral neck, s/p excision 2018  3. Actinic Keratosis, s/p cryotherapy     Encounter Date: 2019    CC:  Chief Complaint   Patient presents with     Skin Check     Hx of NMSC         History of Present Illness:  Mr. Dimitris Munoz is a 53 year old male with a history of NMSC who presents as a follow up for a skin check. Today, he reports that his scar is healing well on his neck. There is a rough spot on his face that he is concerned about. It does not hurt or bleed. His wife does a good job looking over his skin for him. They have not found any other concerning areas. Denies any tender, nonhealing, bleeding skin lesions.     No other concerns addressed today.      Past Medical History:   Patient Active Problem List   Diagnosis     CARDIOVASCULAR SCREENING; LDL GOAL LESS THAN 160     High triglycerides     FH: melanoma     Past Medical History:   Diagnosis Date     CARDIOVASCULAR SCREENING; LDL GOAL LESS THAN 160      Past Surgical History:   Procedure Laterality Date     COLONOSCOPY WITH CO2 INSUFFLATION N/A 2015    Procedure: COLONOSCOPY WITH CO2 INSUFFLATION;  Surgeon: Alfredo Kowalski MD;  Location: [facility] OR     HERNIA REPAIR         Social History:  Patient  reports that he has never smoked. He has never used smokeless tobacco. He reports that he drinks alcohol. He reports that he does not use drugs. Patient works as an , rides bike to work. Daughter going away to college this Fall.      Family History:  Father: Melanoma,   Paternal aunt: Melanoma  Paternal uncle: BCCs    Medications:  Current Outpatient Medications   Medication Sig Dispense Refill     fluticasone (FLONASE) 50 MCG/ACT spray Spray 1-2 sprays into both nostrils daily 1 g 11     IBUPROFEN PO Take 200 mg by mouth 3 times daily 400 to 600 mg        indomethacin (INDOCIN) 50 MG capsule Take 1 capsule (50 mg) by mouth 3 times daily (with meals) 42 capsule 0     tadalafil (CIALIS) 20 MG tablet Take 0.5-1 tablets (10-20 mg) by mouth daily as needed for erectile dysfunction Never use with nitroglycerin, terazosin or doxazosin. (Patient not taking: Reported on 2019) 12 tablet 11       No Known Allergies    Review of Systems:  -Constitutional: Patient is otherwise feeling well, in usual state of health.   -Skin: As above in HPI. No additional skin concerns.    Physical exam:  Vitals: There were no vitals taken for this visit.  GEN: This is a well developed, well-nourished male in no acute distress, in a pleasant mood.    SKIN: Total skin excluding the undergarment areas was performed. The exam included the head/face, neck, both arms, chest, back, abdomen, both legs, digits and/or nails and buttocks.   - Gay Type II  - Scattered brown macules on sun exposed areas.  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   - Multiple regular brown pigmented macules and papules are identified on the face and trunk.   - There are waxy stuck on tan to brown papules on the trunk.  - There is a firm tan/flesh colored papule that dimples with lateral pressure on the right anterior shin.  - No other lesions of concern on areas examined.     Impression/Plan:  1. Family history of melanoma: father () as well as paternal aunt    Recommended yearly skin checks due to family history.    Recommend sunscreens SPF #30 or greater, protective clothing and avoidance of tanning beds.     2. History of NMSC, no evidence of recurrence     Recommend sunscreens SPF #30 or greater, protective clothing and avoidance of tanning beds.    3. Sun damaged skin with solar lentigines with changes of poikiloderma of civatte    Sun precaution was advised including  the use of sun screens of SPF 30 or higher, sun protective clothing, and avoidance of tanning beds.    4. Benign findings including: cherry angiomas, dermatofibroma, seborrheic keratoses    No further intervention required. Patient to report changes.     Patient reassured of the benign nature of these lesions.    5. Multiple clinically benign nevi    No further intervention required. Patient to report changes.     Patient reassured of the benign nature of these lesions.    6. Actinic keratosis. Discussed precancerous nature of these lesions. Recommended treatment to prevent progression to a squamous cell carcinoma.     Cryotherapy procedure note: After verbal consent and discussion of risks and benefits including but no limited to dyspigmentation/scar, blister, and pain, 1 was(were) treated with 1-2mm freeze border for 2 cycles with liquid nitrogen. Post cryotherapy instructions were provided.     Follow-up 1 year for skin exam, sooner for lesions of concern.        Staff Involved:  Scribe/Staff    Scribe Disclosure  I, Carline Bender, am serving as a scribe to document services personally performed by Dr. Jen Vega MD, based on data collection and the provider's statements to me.     Provider Disclosure:   The documentation recorded by the scribe accurately reflects the services I personally performed and the decisions made by me.    Jen Vega MD    Department of Dermatology  [facility]: Phone: [phone], Fax:[phone]  [facility]: Phone: [phone], Fax: [phone]

## 2019-06-05 NOTE — PATIENT INSTRUCTIONS
Cryotherapy    What is it?    Use of a very cold liquid, such as liquid nitrogen, to freeze and destroy abnormal skin cells that need to be removed    What should I expect?    Tenderness and redness    A small blister that might grow and fill with dark purple blood. There may be crusting.    More than one treatment may be needed if the lesions do not go away.    How do I care for the treated area?    Gently wash the area with your hands when bathing.    Use a thin layer of Vaseline to help with healing. You may use a Band-Aid.     The area should heal within 7-10 days and may leave behind a pink or lighter color.     Do not use an antibiotic or Neosporin ointment.     You may take acetaminophen (Tylenol) for pain.     Call your Doctor if you have:    Severe pain    Signs of infection (warmth, redness, cloudy yellow drainage, and or a bad smell)    Questions or concerns    Who should I call with questions?       [facility]: [phone]       [facility]: [phone]       For urgent needs outside of business hours call the [facility] at [phone]        and ask for the dermatology resident on call

## 2019-09-24 NOTE — PATIENT INSTRUCTIONS
Excision Wound Care Instructions  I will experience scar, altered skin color, bleeding, swelling, pain, crusting and redness. I may experience altered sensation. Risks are excessive bleeding, infection, muscle weakness, thick (hypertrophic or keloidal) scar, and recurrence,. A second procedure may be recommended to obtain the best cosmetic or functional result.  Possible complications of any surgical procedure are bleeding, infection, scarring, alteration in skin color and sensation, muscle weakness in the area, wound dehiscence or seperation, or recurrence of the lesion or disease. On occasion, after healing, a secondary procedure or revision may be recommended in order to obtain the best cosmetic or functional result.   After your surgery, a pressure bandage will be placed over the area that has sutures. This will help prevent bleeding. Please follow these instructions, as they will help you to prevent complications as your wound heals.  For the First 48 hours After Surgery:  1. Leave the pressure bandage on and keep it dry. If it should come loose, you may retape it, but do not take it off.  2. Relax and take it easy. Do not do any vigorous exercise, heavy lifting, or bending forward. This could cause the wound to bleed.  3. Post-operative pain is usually mild. You may take plain or extra strength Tylenol every 4 hours as needed (do not take more than 4,000mg in one day). Do not take any medicine that contains aspirin, ibuprofen or motrin unless you have been recommended these by a doctor.  Avoid alcohol and vitamin E as these may increase your tendency to bleed.  4. You may put an ice pack around the bandaged area for 20 minutes every 2-3 hours. This may help reduce swelling, bruising, and pain. Make sure the ice pack is waterproof so that the pressure bandage does not get wet.   5. You may see a small amount of drainage or blood on your pressure bandage. This is normal. However, if drainage or bleeding  continues or saturates the bandage, you will need to apply firm pressure over the bandage with a washcloth for 15 minutes. If bleeding continues after applying pressure for 15 minutes then go to the nearest emergency room.  48 Hours After Surgery  Carefully remove the bandage and start daily wound care and dressing changes. You may also now shower and get the wound wet. Wash wound with a mild soap and water.  Use caution when washing the wound. Be gentle and do not let the forceful shower stream hit the wound directly.  PAT dry.  Daily Wound Care:  1. Wash wound with a mild soap and water.  Use caution when washing the wound, be gentle and do not let the forceful shower stream hit the wound directly.  2. PAT DRY.  3. Apply Vaseline (from a new container or tube) over the suture line with a Q-tip. It is very important to keep the wound continuously moist, as wounds heal best in a moist environment.  4.  Keep the site covered until sutures are removed, you can cover it with a Telfa (non-stick) dressing and tape or a band-aid.    5. If you are unable to keep wound covered, you must apply Vaseline every 2 - 3 hours (while awake) to ensure it is being kept moist for optimal healing. A dressing overnight is recommended to keep the area moist.   Call Us If:  1. You have pain that is not controlled with Tylenol.  2. You have signs or symptoms of an infection, such as: fever over 100 degrees F, redness, warmth, or foul-smelling or yellow/creamy drainage from the wound.  Who should I call with questions?    [facility]: [phone]     [facility]: [phone]    For urgent needs outside of business hours call the [facility] at [phone] and ask for the dermatology resident on call       18 year old male with hypothyroidism, gastritis, presented due to 1 week duration of left sided chest pain. Patient reports left sided pain in lower chest, upper abdomen, exacerbated by food and touch, associated with nausea, vomiting, non-bloody nonbilious As well as several episodes of diarrhea, watery non-bloody. He reports feeling overall fatigued, has been having very bad po intake.    Patient with similar presentation in October 2018, was cleared to follow-up with gastroenterology, endoscopy was done, showed gastritis, was started on dicyclomine and protonix, that he stopped taking because they were not helping.    He first presented to ED 5 days ago, discharged as a viral upper respiratory tract infection.    patient denies alcohol or cigarette use, but has been using Vape with nicotine and THC products for years.        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 He began feeling better. RVP and urine antigens were negative, he was put on a steroid taper, he was educated about associations with vaping, and he was discharged. 18 year old male with hypothyroidism, gastritis, presented due to 1 week duration of left sided chest pain. Patient reports left sided pain in lower chest, upper abdomen, exacerbated by food and touch, associated with nausea, vomiting, non-bloody nonbilious As well as several episodes of diarrhea, watery non-bloody. He reports feeling overall fatigued, has been having very bad po intake.    Patient with similar presentation in October 2018, was cleared to follow-up with gastroenterology, endoscopy was done, showed gastritis, was started on dicyclomine and protonix, that he stopped taking because they were not helping.    He first presented to ED 5 days ago, discharged as a viral upper respiratory tract infection.    patient denies alcohol or cigarette use, but has been using Vape with nicotine and THC products for years.        He was tolerating regular diet.    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 He began feeling better. RVP and urine antigens were negative, he was put on a steroid taper, he was educated about associations with vaping, and he was discharged. 18 year old male with hypothyroidism, gastritis, presented due to 1 week duration of left sided chest pain. Patient reports left sided pain in lower chest, upper abdomen, exacerbated by food and touch, associated with nausea, vomiting, non-bloody nonbilious As well as several episodes of diarrhea, watery non-bloody. He reports feeling overall fatigued, has been having very bad po intake.    Patient with similar presentation in October 2018, was cleared to follow-up with gastroenterology, endoscopy was done, showed gastritis, was started on dicyclomine and protonix, that he stopped taking because they were not helping.    He first presented to ED 5 days ago, discharged as a viral upper respiratory tract infection.    patient denies alcohol or cigarette use, but has been using Vape with nicotine and THC products for years.        He was tolerating regular diet and X ray was improved but still showed diffuse reticular opacities.    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 He began feeling better. RVP and urine antigens were negative, he was put on a steroid taper, he was educated about associations with vaping, and he was discharged.

## 2020-02-23 ENCOUNTER — HEALTH MAINTENANCE LETTER (OUTPATIENT)
Age: 55
End: 2020-02-23

## 2020-03-02 ENCOUNTER — OFFICE VISIT (OUTPATIENT)
Dept: FAMILY MEDICINE | Facility: CLINIC | Age: 55
End: 2020-03-02
Payer: COMMERCIAL

## 2020-03-02 VITALS
SYSTOLIC BLOOD PRESSURE: 155 MMHG | WEIGHT: 230 LBS | DIASTOLIC BLOOD PRESSURE: 78 MMHG | OXYGEN SATURATION: 97 % | HEIGHT: 72 IN | HEART RATE: 65 BPM | TEMPERATURE: 99.1 F | RESPIRATION RATE: 20 BRPM | BODY MASS INDEX: 31.15 KG/M2

## 2020-03-02 DIAGNOSIS — R05.3 PERSISTENT COUGH: Primary | ICD-10-CM

## 2020-03-02 DIAGNOSIS — R09.82 POST-NASAL DRIP: ICD-10-CM

## 2020-03-02 PROCEDURE — 99213 OFFICE O/P EST LOW 20 MIN: CPT | Performed by: FAMILY MEDICINE

## 2020-03-02 RX ORDER — FLUTICASONE PROPIONATE 50 MCG
2 SPRAY, SUSPENSION (ML) NASAL DAILY
Qty: 9.9 ML | Refills: 0 | Status: SHIPPED | OUTPATIENT
Start: 2020-03-02 | End: 2020-03-25

## 2020-03-02 RX ORDER — CODEINE PHOSPHATE AND GUAIFENESIN 10; 100 MG/5ML; MG/5ML
1-2 SOLUTION ORAL EVERY 4 HOURS PRN
Qty: 240 ML | Refills: 0 | Status: SHIPPED | OUTPATIENT
Start: 2020-03-02 | End: 2020-08-20

## 2020-03-02 RX ORDER — GUAIFENESIN 200 MG/10ML
200 LIQUID ORAL EVERY 4 HOURS PRN
COMMUNITY
End: 2021-08-19

## 2020-03-02 ASSESSMENT — PAIN SCALES - GENERAL: PAINLEVEL: NO PAIN (0)

## 2020-03-02 ASSESSMENT — MIFFLIN-ST. JEOR: SCORE: 1911.27

## 2020-03-02 NOTE — NURSING NOTE
Chief Complaint   Patient presents with     Cough     started with cold x2 weeks ago, cough still going on     Health Maintenance     order pended, Adv Dir, PHQ2,        Initial BP (!) 155/78   Pulse 65   Temp 99.1  F (37.3  C) (Oral)   Resp 20   Ht 1.829 m (6')   Wt 104.3 kg (230 lb)   SpO2 97%   BMI 31.19 kg/m   Estimated body mass index is 31.19 kg/m  as calculated from the following:    Height as of this encounter: 1.829 m (6').    Weight as of this encounter: 104.3 kg (230 lb).  Medication Reconciliation: complete  Nadira Gonzalez, CMA

## 2020-03-02 NOTE — PROGRESS NOTES
SUBJECTIVE:   Dimitris Munoz is a 54 year old male presenting with a chief complaint of a cough.  The patient first noted the onset of symptoms was 2 week(s) ago.  The patient (or parent) reports that he first had symptoms of nasal congestion, rhinorrhea and a cough . After that he started having symptoms of a cough productive of green mucous. He reports that he usually coughs up a gob of greenish brown phlegm in the am. The rest of the day his cough is not productive,       He (or parent) denies: fever.  He (or parent) denies: shortness of breath.  He (or parent) denies: wheezing.    The patient (or parent) reports that he had tried a cough suppressant named Gerhard DMM and it has been helpful.  The patient has the following predisposing factors for infection:None.    He has had more burping lately.     He is taking omeprazole for the last 4-5 day(s) no help so far  Rare gerd symptom(s)     Patient Active Problem List   Diagnosis     CARDIOVASCULAR SCREENING; LDL GOAL LESS THAN 160     High triglycerides     FH: melanoma     Current Outpatient Medications   Medication Sig Dispense Refill     guaiFENesin (ROBITUSSIN) 100 MG/5ML liquid Take 200 mg by mouth every 4 hours as needed for cough       IBUPROFEN PO Take 200 mg by mouth 3 times daily 400 to 600 mg       Omeprazole (PRILOSEC PO)        indomethacin (INDOCIN) 50 MG capsule Take 1 capsule (50 mg) by mouth 3 times daily (with meals) (Patient not taking: Reported on 3/2/2020) 42 capsule 0     Social History     Tobacco Use     Smoking status: Never Smoker     Smokeless tobacco: Never Used   Substance Use Topics     Alcohol use: Yes     Comment: 10 beers per week           OBJECTIVE  :BP (!) 155/78   Pulse 65   Temp 99.1  F (37.3  C) (Oral)   Resp 20   Ht 1.829 m (6')   Wt 104.3 kg (230 lb)   SpO2 97%   BMI 31.19 kg/m    GENERAL APPEARANCE: healthy, alert and no distress  EYES: EOMI,  PERRL, conjunctiva clear  HENT: ear canals and TM's normal.  Nose and mouth  without ulcers, erythema or lesions  HENT: rhinorrhea clear  NECK: supple, nontender, no lymphadenopathy  RESP: lungs clear to auscultation - no rales, rhonchi or wheezes  CV: regular rates and rhythm, normal S1 S2, no murmur noted  ABDOMEN:  soft, nontender, no HSM or masses and bowel sounds normal  NEURO: Normal strength and tone, sensory exam grossly normal,  normal speech and mentation  SKIN: no suspicious lesions or rashes    ASSESSMENT:  Cough secondary to persisting post nasal drip     PLAN:  The patient was reassured that there is no evidence of a bacterial etiology., I recommended that the patient get lots of fluids and rest., A prescription for Cherritussin AC was given and A prescription for nasal steroid was given    During the visit I did wear a mask the entire time I was in the exam room with the patient.    During the visit the patient did wear a mask while I was in the exam room with him  except when I was examining his nose and throat or doing the nasopharyngeal swab.

## 2020-03-25 DIAGNOSIS — R05.3 PERSISTENT COUGH: ICD-10-CM

## 2020-03-25 DIAGNOSIS — R09.82 POST-NASAL DRIP: ICD-10-CM

## 2020-03-25 RX ORDER — FLUTICASONE PROPIONATE 50 MCG
SPRAY, SUSPENSION (ML) NASAL
Qty: 16 ML | Refills: 0 | Status: SHIPPED | OUTPATIENT
Start: 2020-03-25 | End: 2021-08-19

## 2020-03-25 NOTE — TELEPHONE ENCOUNTER
Routing refill request to provider for review/approval because:  Drug not on the [facility] refill protocol   Ronda Cornelius BSN, RN

## 2020-08-20 ENCOUNTER — OFFICE VISIT (OUTPATIENT)
Dept: DERMATOLOGY | Facility: CLINIC | Age: 55
End: 2020-08-20
Payer: COMMERCIAL

## 2020-08-20 DIAGNOSIS — L82.1 SEBORRHEIC KERATOSIS: ICD-10-CM

## 2020-08-20 DIAGNOSIS — L81.4 SOLAR LENTIGO: ICD-10-CM

## 2020-08-20 DIAGNOSIS — D22.9 MULTIPLE BENIGN NEVI: ICD-10-CM

## 2020-08-20 DIAGNOSIS — L57.0 ACTINIC KERATOSIS: ICD-10-CM

## 2020-08-20 DIAGNOSIS — Z85.828 HISTORY OF NONMELANOMA SKIN CANCER: Primary | ICD-10-CM

## 2020-08-20 DIAGNOSIS — D23.9 DERMATOFIBROMA: ICD-10-CM

## 2020-08-20 DIAGNOSIS — L57.8 SUN-DAMAGED SKIN: ICD-10-CM

## 2020-08-20 DIAGNOSIS — Z80.8 FAMILY HX OF MELANOMA: ICD-10-CM

## 2020-08-20 DIAGNOSIS — D18.01 CHERRY ANGIOMA: ICD-10-CM

## 2020-08-20 PROCEDURE — 99214 OFFICE O/P EST MOD 30 MIN: CPT | Mod: 25 | Performed by: DERMATOLOGY

## 2020-08-20 PROCEDURE — 17003 DESTRUCT PREMALG LES 2-14: CPT | Performed by: DERMATOLOGY

## 2020-08-20 PROCEDURE — 17000 DESTRUCT PREMALG LESION: CPT | Performed by: DERMATOLOGY

## 2020-08-20 ASSESSMENT — PAIN SCALES - GENERAL: PAINLEVEL: NO PAIN (0)

## 2020-08-20 NOTE — PROGRESS NOTES
[facility] Dermatology Note      Dermatology Problem List:  1. Family history of melanoma (father, ; paternal aunt)  2. BCC, right lateral neck, s/p excision 2018  3. Actinic Keratosis, s/p cryotherapy     Encounter Date: Aug 20, 2020    CC:  No chief complaint on file.        History of Present Illness:  Mr. Dimitris Munoz is a 54 year old male with a history of NMSC who presents as a follow up for a skin check.     He was last seen for a skin check on 19, when one AK was treated with cryotherapy.     Today, he reports concerns no specific skin concerns. When asked about a rough patch on the right cheek, he notes this has been there for a few months. No pain at this site or bleeding. Denies any tender, nonhealing, bleeding skin lesions.     No other concerns addressed today.      Past Medical History:   Patient Active Problem List   Diagnosis     CARDIOVASCULAR SCREENING; LDL GOAL LESS THAN 160     High triglycerides     FH: melanoma     Past Medical History:   Diagnosis Date     CARDIOVASCULAR SCREENING; LDL GOAL LESS THAN 160      Past Surgical History:   Procedure Laterality Date     COLONOSCOPY WITH CO2 INSUFFLATION N/A 2015    Procedure: COLONOSCOPY WITH CO2 INSUFFLATION;  Surgeon: Alfredo Kowalski MD;  Location: [facility] OR     HERNIA REPAIR         Social History:  Patient  reports that he has never smoked. He has never used smokeless tobacco. He reports current alcohol use. He reports that he does not use drugs. Patient works as an , rides bike to work. Has a cabin he spends time at. Wears sunscreen fairly diligently.    Family History:  Father: Melanoma,   Paternal aunt: Melanoma  Paternal uncle: BCCs  Reviewed and left in chart for clinician convenience.       Medications:  Current Outpatient Medications   Medication Sig Dispense Refill     fluticasone (FLONASE) 50 MCG/ACT nasal spray INSTILL 2 SPRAYS INTO BOTH NOSTRILS DAILY 16 mL 0      guaiFENesin (ROBITUSSIN) 100 MG/5ML liquid Take 200 mg by mouth every 4 hours as needed for cough       guaiFENesin-codeine (ROBITUSSIN AC) 100-10 MG/5ML solution Take 5-10 mLs by mouth every 4 hours as needed for cough 240 mL 0     IBUPROFEN PO Take 200 mg by mouth 3 times daily 400 to 600 mg       indomethacin (INDOCIN) 50 MG capsule Take 1 capsule (50 mg) by mouth 3 times daily (with meals) (Patient not taking: Reported on 3/2/2020) 42 capsule 0     Omeprazole (PRILOSEC PO)          No Known Allergies    Review of Systems:  -Constitutional: Patient is otherwise feeling well, in usual state of health.   -Skin: As above in HPI. No additional skin concerns.    Physical exam:  Vitals: There were no vitals taken for this visit.  GEN: This is a well developed, well-nourished male in no acute distress, in a pleasant mood.    SKIN: Total skin excluding the undergarment areas was performed. The exam included the head/face, neck, both arms, chest, back, abdomen, both legs, digits and/or nails and buttocks.   - Gay Type II-III  - Scattered brown macules on sun exposed areas.  - gritty pink papules right cheek, right nasal side wall, right forehead, left cheek.   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 On the left side of the nose, there are three dermal nevi.  - There are waxy stuck on tan to brown papules on the trunk.  - There is a firm tan/flesh colored papule that dimples with lateral pressure on the right medial ankle.-   - No other lesions of concern on areas examined.     Impression/Plan:  1. Family history of melanoma: father () as well as paternal aunt    Recommended yearly skin checks due to family history.    Recommend sunscreens SPF #30 or greater, protective clothing and avoidance of tanning  beds.     2. History of NMSC, no evidence of recurrence     Recommend sunscreens SPF #30 or greater, protective clothing and avoidance of tanning beds.    3. Sun damaged skin with solar lentigines with changes of poikiloderma of civatte    Sun precaution was advised including the use of sun screens of SPF 30 or higher, sun protective clothing, and avoidance of tanning beds.    4. Benign findings including: cherry angiomas, dermatofibroma, seborrheic keratoses    No further intervention required. Patient to report changes.     Patient reassured of the benign nature of these lesions.    5. Multiple clinically benign nevi    No further intervention required. Patient to report changes.     Patient reassured of the benign nature of these lesions.    6. Actinic keratosis. Discussed precancerous nature of these lesions. Recommended treatment to prevent progression to a squamous cell carcinoma.     Cryotherapy procedure note: After verbal consent and discussion of risks and benefits including but no limited to dyspigmentation/scar, blister, and pain, 4 was(were) treated with 1-2mm freeze border for 2 cycles with liquid nitrogen. Post cryotherapy instructions were provided.     Follow-up 1 year for skin exam, sooner for lesions of concern.        Staff Involved:  Staff only    Jen Vega MD    Department of Dermatology  [facility]: Phone: [phone], Fax:[phone]  [facility]: Phone: [phone], Fax: [phone]

## 2020-08-20 NOTE — NURSING NOTE
Dimitris Munoz's goals for this visit include:   Chief Complaint   Patient presents with     Skin Check     Personal hx of BCC, family hx of Melanoma, areas of concerns: right side of stomach, not immunosuppressed      He requests these members of his care team be copied on today's visit information: Yes    PCP: Byron Modi    Referring Provider:  Jen Vega MD  [address] [place] [id number]    There were no vitals taken for this visit.    Do you need any medication refills at today's visit? No    Paulette Mcdonald LPN

## 2020-08-20 NOTE — PATIENT INSTRUCTIONS
Cryotherapy    What is it?    Use of a very cold liquid, such as liquid nitrogen, to freeze and destroy abnormal skin cells that need to be removed    What should I expect?    Tenderness and redness    A small blister that might grow and fill with dark purple blood. There may be crusting.    More than one treatment may be needed if the lesions do not go away.    How do I care for the treated area?    Gently wash the area with your hands when bathing.    Use a thin layer of Vaseline to help with healing. You may use a Band-Aid.     The area should heal within 7-10 days and may leave behind a pink or lighter color.     Do not use an antibiotic or Neosporin ointment.     You may take acetaminophen (Tylenol) for pain.     Call your Doctor if you have:    Severe pain    Signs of infection (warmth, redness, cloudy yellow drainage, and or a bad smell)    Questions or concerns    Who should I call with questions?       [facility]: [phone]       [facility]: [phone]       For urgent needs outside of business hours call the [facility] at [phone]        and ask for the dermatology resident on call

## 2020-08-20 NOTE — LETTER
2020         RE: Dimitris Munoz  [address] [address] [place] [address]        Dear Colleague,    Thank you for referring your patient, Dimitris Munoz, to the [facility]. Please see a copy of my visit note below.    [facility] Dermatology Note      Dermatology Problem List:  1. Family history of melanoma (father, ; paternal aunt)  2. BCC, right lateral neck, s/p excision 2018  3. Actinic Keratosis, s/p cryotherapy     Encounter Date: Aug 20, 2020    CC:  No chief complaint on file.        History of Present Illness:  Mr. Dimitris Munoz is a 54 year old male with a history of NMSC who presents as a follow up for a skin check.     He was last seen for a skin check on 19, when one AK was treated with cryotherapy.     Today, he reports concerns no specific skin concerns. When asked about a rough patch on the right cheek, he notes this has been there for a few months. No pain at this site or bleeding. Denies any tender, nonhealing, bleeding skin lesions.     No other concerns addressed today.      Past Medical History:   Patient Active Problem List   Diagnosis     CARDIOVASCULAR SCREENING; LDL GOAL LESS THAN 160     High triglycerides     FH: melanoma     Past Medical History:   Diagnosis Date     CARDIOVASCULAR SCREENING; LDL GOAL LESS THAN 160      Past Surgical History:   Procedure Laterality Date     COLONOSCOPY WITH CO2 INSUFFLATION N/A 2015    Procedure: COLONOSCOPY WITH CO2 INSUFFLATION;  Surgeon: Alfredo Kowalski MD;  Location: [facility] OR     HERNIA REPAIR         Social History:  Patient  reports that he has never smoked. He has never used smokeless tobacco. He reports current alcohol use. He reports that he does not use drugs. Patient works as an , rides bike to work. Has a cabin he spends time at. Wears sunscreen fairly diligently.    Family History:  Father: Melanoma,   Paternal aunt: Melanoma  Paternal uncle:  BCCs  Reviewed and left in chart for clinician convenience.       Medications:  Current Outpatient Medications   Medication Sig Dispense Refill     fluticasone (FLONASE) 50 MCG/ACT nasal spray INSTILL 2 SPRAYS INTO BOTH NOSTRILS DAILY 16 mL 0     guaiFENesin (ROBITUSSIN) 100 MG/5ML liquid Take 200 mg by mouth every 4 hours as needed for cough       guaiFENesin-codeine (ROBITUSSIN AC) 100-10 MG/5ML solution Take 5-10 mLs by mouth every 4 hours as needed for cough 240 mL 0     IBUPROFEN PO Take 200 mg by mouth 3 times daily 400 to 600 mg       indomethacin (INDOCIN) 50 MG capsule Take 1 capsule (50 mg) by mouth 3 times daily (with meals) (Patient not taking: Reported on 3/2/2020) 42 capsule 0     Omeprazole (PRILOSEC PO)          No Known Allergies    Review of Systems:  -Constitutional: Patient is otherwise feeling well, in usual state of health.   -Skin: As above in HPI. No additional skin concerns.    Physical exam:  Vitals: There were no vitals taken for this visit.  GEN: This is a well developed, well-nourished male in no acute distress, in a pleasant mood.    SKIN: Total skin excluding the undergarment areas was performed. The exam included the head/face, neck, both arms, chest, back, abdomen, both legs, digits and/or nails and buttocks.   - Gay Type II-III  - Scattered brown macules on sun exposed areas.  - gritty pink papules right cheek, right nasal side wall, right forehead, left cheek.   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 On the left side of the nose, there are three dermal nevi.  - There are waxy stuck on tan to brown papules on the trunk.  - There is a firm tan/flesh colored papule that dimples with lateral pressure on the right medial ankle.-   - No other lesions of concern on areas  examined.     Impression/Plan:  1. Family history of melanoma: father () as well as paternal aunt    Recommended yearly skin checks due to family history.    Recommend sunscreens SPF #30 or greater, protective clothing and avoidance of tanning beds.     2. History of NMSC, no evidence of recurrence     Recommend sunscreens SPF #30 or greater, protective clothing and avoidance of tanning beds.    3. Sun damaged skin with solar lentigines with changes of poikiloderma of civatte    Sun precaution was advised including the use of sun screens of SPF 30 or higher, sun protective clothing, and avoidance of tanning beds.    4. Benign findings including: cherry angiomas, dermatofibroma, seborrheic keratoses    No further intervention required. Patient to report changes.     Patient reassured of the benign nature of these lesions.    5. Multiple clinically benign nevi    No further intervention required. Patient to report changes.     Patient reassured of the benign nature of these lesions.    6. Actinic keratosis. Discussed precancerous nature of these lesions. Recommended treatment to prevent progression to a squamous cell carcinoma.     Cryotherapy procedure note: After verbal consent and discussion of risks and benefits including but no limited to dyspigmentation/scar, blister, and pain, 4 was(were) treated with 1-2mm freeze border for 2 cycles with liquid nitrogen. Post cryotherapy instructions were provided.     Follow-up 1 year for skin exam, sooner for lesions of concern.        Staff Involved:  Staff only    Jen Vega MD    Department of Dermatology  [facility]: Phone: [phone], Fax:[phone]  [facility]: Phone: [phone], Fax: [phone]      Again, thank you for allowing me to participate in the care of your patient.        Sincerely,        Jen Vega MD

## 2020-10-29 ENCOUNTER — VIRTUAL VISIT (OUTPATIENT)
Dept: FAMILY MEDICINE | Facility: OTHER | Age: 55
End: 2020-10-29

## 2020-10-29 NOTE — PROGRESS NOTES
"Date: 10/29/2020 15:10:14  Clinician: Mara Alvarado  Clinician NPI: [id number]  Patient: Dimitris Munoz  Patient : 1965  Patient Address: [id number] [place] [address]  Patient Phone: [phone]  Visit Protocol: URI  Patient Summary:  Dimitris is a 55 year old ( : 1965 ) male who initiated a [facility] Visit for COVID-19 (Coronavirus) evaluation and screening. When asked the question \"Please sign me up to receive news, health information and promotions from [facility]", Dimitris responded \"Yes\".    Dimitris states his symptoms started 1-2 days ago.   His symptoms consist of a cough, malaise, and rhinitis.   Symptom details     Nasal secretions: The color of his mucus is clear.    Cough: Dimitris coughs a few times an hour and his cough is more bothersome at night. Phlegm does not come into his throat when he coughs. He believes his cough is caused by post-nasal drip.      Dimitris denies having ear pain, headache, wheezing, fever, nasal congestion, nausea, facial pain or pressure, myalgias, chills, sore throat, teeth pain, ageusia, diarrhea, anosmia, and vomiting. He also denies having recent facial or sinus surgery in the past 60 days and taking antibiotic medication in the past month. He is not experiencing dyspnea.   Precipitating events  He has not recently been exposed to someone with influenza. Dimitris has not been in close contact with any high risk individuals.   Pertinent COVID-19 (Coronavirus) information  Dimitris does not work or volunteer as healthcare worker or a . In the past 14 days, Dimitris has not worked or volunteered at a healthcare facility or group living setting.   In the past 14 days, he also has not lived in a congregate living setting.   Dimitris has had a close contact with a laboratory-confirmed COVID-19 patient within 14 days of symptom onset. He was not exposed at his work. Date Dimitris was exposed to the laboratory-confirmed COVID-19 patient: " 10/24/2020   Additional information about contact with COVID-19 (Coronavirus) patient as reported by the patient (free text): My wife has been confirmed with COVID.  I have been in the same household and exposed since Saturday Oct, 24.    Since December 2019, Dimitris has not been diagnosed with lab-confirmed COVID-19 test and has not had upper respiratory infection or influenza-like illness.   Pertinent medical history  Dimitris does not need a return to work/school note.   Weight: 235 lbs   Dimitris does not smoke or use smokeless tobacco.   Weight: 235 lbs    MEDICATIONS: No current medications, ALLERGIES: NKDA  Clinician Response:  Dear Dimitris,   Your symptoms show that you may have coronavirus (COVID-19). This illness can cause fever, cough and trouble breathing. Many people get a mild case and get better on their own. Some people can get very sick.  What should I do?  We would like to test you for this virus.   1. Please call [phone] to schedule your visit. Explain that you were referred by [facility] to have a COVID-19 test. Be ready to share your [facility] visit ID number.  The following will serve as your written order for this COVID Test, ordered by me, for the indication of suspected COVID [Z20.828]: The test will be ordered in [employer], our electronic health record, after you are scheduled. It will show as ordered and authorized by Martin Lewis MD.  Order: COVID-19 (Coronavirus) PCR for SYMPTOMATIC testing from [facility].   2. When it's time for your COVID test:  Stay at least 6 feet away from others. (If someone will drive you to your test, stay in the backseat, as far away from the  as you can.)   Cover your mouth and nose with a mask, tissue or washcloth.  Go straight to the testing site. Don't make any stops on the way there or back.      3.Starting now: Stay home and away from others (self-isolate) until:   You've had no fever---and no medicine that reduces fever---for one full day (24 hours). And...    "Your other symptoms have gotten better. For example, your cough or breathing has improved. And...   At least 10 days have passed since your symptoms started.       During this time, don't leave the house except for testing or medical care.   Stay in your own room, even for meals. Use your own bathroom if you can.   Stay away from others in your home. No hugging, kissing or shaking hands. No visitors.  Don't go to work, school or anywhere else.    Clean \"high touch\" surfaces often (doorknobs, counters, handles, etc.). Use a household cleaning spray or wipes. You'll find a full list of  on the EPA website: www.epa.gov/pesticide-registration/list-n-disinfectants-use-against-sars-cov-2.   Cover your mouth and nose with a mask, tissue or washcloth to avoid spreading germs.  Wash your hands and face often. Use soap and water.  Caregivers in these groups are at risk for severe illness due to COVID-19:  o People 65 years and older  o People who live in a nursing home or long-term care facility  o People with chronic disease (lung, heart, cancer, diabetes, kidney, liver, immunologic)  o People who have a weakened immune system, including those who:   Are in cancer treatment  Take medicine that weakens the immune system, such as corticosteroids  Had a bone marrow or organ transplant  Have an immune deficiency  Have poorly controlled HIV or AIDS  Are obese (body mass index of 40 or higher)  Smoke regularly   o Caregivers should wear gloves while washing dishes, handling laundry and cleaning bedrooms and bathrooms.  o Use caution when washing and drying laundry: Don't shake dirty laundry, and use the warmest water setting that you can.  o For more tips, go to www.cdc.gov/coronavirus/2019-ncov/downloads/10Things.pdf.    4.Sign up for [employer]. We know it's scary to hear that you might have COVID-19. We want to track your symptoms to make sure you're okay over the next 2 weeks. Please look for an email from [employer]---this is a free, online program that we'll use to keep in touch. To sign up, follow the link in the email. Learn more at [email or web address]  How can I take care of myself?   Get lots of rest. Drink extra fluids (unless a doctor has told you not to).   Take Tylenol (acetaminophen) for fever or pain. If you have liver or kidney problems, ask your family doctor if it's okay to take Tylenol.   Adults can take either:    650 mg (two 325 mg pills) every 4 to 6 hours, or...   1,000 mg (two 500 mg pills) every 8 hours as needed.    Note: Don't take more than 3,000 mg in one day. Acetaminophen is found in many medicines (both prescribed and over-the-counter medicines). Read all labels to be sure you don't take too much.   For children, check the Tylenol bottle for the right dose. The dose is based on the child's age or weight.    If you have other health problems (like cancer, heart failure, an organ transplant or severe kidney disease): Call your specialty clinic if you don't feel better in the next 2 days.       Know when to call 911. Emergency warning signs include:    Trouble breathing or shortness of breath Pain or pressure in the chest that doesn't go away Feeling confused like you haven't felt before, or not being able to wake up Bluish-colored lips or face.  Where can I get more information?   [facility] -- About COVID-19: [email or web address]   CDC -- What to Do If You're Sick: www.cdc.gov/coronavirus/2019-ncov/about/steps-when-sick.html   CDC -- Ending Home Isolation: www.cdc.gov/coronavirus/2019-ncov/hcp/disposition-in-home-patients.html   CDC -- Caring for Someone: www.cdc.gov/coronavirus/2019-ncov/if-you-are-sick/care-for-someone.html   [facility] -- Interim Guidance for Hospital Discharge to Home: www.health.[facility].[place].us/diseases/coronavirus/hcp/hospdischarge.pdf   [facility] clinical trials (COVID-19 research studies): clinicalaffairs.[facility]-clinical-trials     Below are the COVID-19 hotlines at the [place] Department of Health ([facility]). Interpreters are available.    For health questions: Call 724-069-6094 or 1-842.298.5822 (7 a.m. to 7 p.m.) For questions about schools and childcare: Call 292-935-5093 or 1-838.640.7627 (7 a.m. to 7 p.m.)    Diagnosis: Contact with and (suspected) exposure to other viral communicable diseases  Diagnosis ICD: Z20.828

## 2020-11-02 DIAGNOSIS — Z20.822 SUSPECTED 2019 NOVEL CORONAVIRUS INFECTION: Primary | ICD-10-CM

## 2020-11-02 PROCEDURE — U0003 INFECTIOUS AGENT DETECTION BY NUCLEIC ACID (DNA OR RNA); SEVERE ACUTE RESPIRATORY SYNDROME CORONAVIRUS 2 (SARS-COV-2) (CORONAVIRUS DISEASE [COVID-19]), AMPLIFIED PROBE TECHNIQUE, MAKING USE OF HIGH THROUGHPUT TECHNOLOGIES AS DESCRIBED BY CMS-2020-01-R: HCPCS | Performed by: FAMILY MEDICINE

## 2020-11-03 ENCOUNTER — NURSE TRIAGE (OUTPATIENT)
Dept: NURSING | Facility: CLINIC | Age: 55
End: 2020-11-03

## 2020-11-03 LAB
SARS-COV-2 RNA SPEC QL NAA+PROBE: ABNORMAL
SPECIMEN SOURCE: ABNORMAL

## 2020-11-03 NOTE — TELEPHONE ENCOUNTER
"Coronavirus (COVID-19) Notification    Caller Name (Patient, parent, daughter/son, grandparent, etc)  Dimitris Munoz    Reason for call  Notify of Positive Coronavirus (COVID-19) lab results, assess symptoms,  review [facility] recommendations    Lab Result    Lab test:  2019-nCoV rRt-PCR or SARS-CoV-2 PCR    Oropharyngeal AND/OR nasopharyngeal swabs is POSITIVE for 2019-nCoV RNA/SARS-COV-2 PCR (COVID-19 virus)    RN Recommendations/Instructions per [facility] Coronavirus COVID-19 recommendations    Brief introduction script  Introduce self then review script:  \"I am calling on behalf of "[employer].  We were notified that your Coronavirus test (COVID-19) for was POSITIVE for the virus.  I have some information to relay to you but first I wanted to mention that the [place] Dept of Health will be contacting you shortly [it's possible MD already called Patient] to talk to you more about how you are feeling and other people you have had contact with who might now also have the virus.  Also, [facility] is Partnering with the [facility] for Covid-19 research, you may be contacted directly by research staff.\"    Assessment (Inquire about Patient's current symptoms)   Assessment   Current Symptoms at time of phone call: (if no symptoms, document No symptoms] No symptoms   Symptoms onset (if applicable) 10/27/2020     If at time of call, Patients symptoms hare worsened, the Patient should contact 911 or have someone drive them to Emergency Dept promptly:      If Patient calling 911, inform 911 personal that you have tested positive for the Coronavirus (COVID-19).  Place mask on and await 911 to arrive.    If Emergency Dept, If possible, please have another adult drive you to the Emergency Dept but you need to wear mask when in contact with other people.      Review information with Patient    Your result was positive. This means you have COVID-19 (coronavirus).  We have sent you a letter that " reviews the information that I'll be reviewing with you now.    How can I protect others?    If you have symptoms: stay home and away from others (self-isolate) until:    You've had no fever--and no medicine that reduces fever--for 1 full day (24 hours). And       Your other symptoms have gotten better. For example, your cough or breathing has improved. And     At least 10 days have passed since your symptoms started. (If you've been told by a doctor that you have a weak immune system, wait 20 days.)     If you don't have symptoms: Stay home and away from others (self-isolate) until at least 10 days have passed since your first positive COVID-19 test. (Date test collected)    During this time:    Stay in your own room, including for meals. Use your own bathroom if you can.    Stay away from others in your home. No hugging, kissing or shaking hands. No visitors.     Don't go to work, school or anywhere else.     Clean  high touch  surfaces often (doorknobs, counters, handles, etc.). Use a household cleaning spray or wipes. You'll find a full list on the EPA website at www.epa.gov/pesticide-registration/list-n-disinfectants-use-against-sars-cov-2.     Cover your mouth and nose with a mask, tissue or other face covering to avoid spreading germs.    Wash your hands and face often with soap and water.    Caregivers in these groups are at risk for severe illness due to COVID-19:  o People 65 years and older  o People who live in a nursing home or long-term care facility  o People with chronic disease (lung, heart, cancer, diabetes, kidney, liver, immunologic)  o People who have a weakened immune system, including those who:  - Are in cancer treatment  - Take medicine that weakens the immune system, such as corticosteroids  - Had a bone marrow or organ transplant  - Have an immune deficiency  - Have poorly controlled HIV or AIDS  - Are obese (body mass index of 40 or higher)  - Smoke regularly    Caregivers should wear  gloves while washing dishes, handling laundry and cleaning bedrooms and bathrooms.    Wash and dry laundry with special caution. Don't shake dirty laundry, and use the warmest water setting you can.    If you have a weakened immune system, ask your doctor about other actions you should take.    For more tips, go to www.cdc.gov/coronavirus/2019-ncov/downloads/10Things.pdf.    You should not go back to work until you meet the guidelines above for ending your home isolation. You don't need to be retested for COVID-19 before going back to work--studies show that you won't spread the virus if it's been at least 10 days since your symptoms started (or 20 days, if you have a weak immune system).    Employers: This document serves as formal notice of your employee's medical guidelines for going back to work. They must meet the above guidelines before going back to work in person.    How can I take care of myself?    1. Get lots of rest. Drink extra fluids (unless a doctor has told you not to).    2. Take Tylenol (acetaminophen) for fever or pain. If you have liver or kidney problems, ask your family doctor if it's okay to take Tylenol.     Take either:     650 mg (two 325 mg pills) every 4 to 6 hours, or     1,000 mg (two 500 mg pills) every 8 hours as needed.     Note: Don't take more than 3,000 mg in one day. Acetaminophen is found in many medicines (both prescribed and over-the-counter medicines). Read all labels to be sure you don't take too much.    For children, check the Tylenol bottle for the right dose (based on their age or weight).    3. If you have other health problems (like cancer, heart failure, an organ transplant or severe kidney disease): Call your specialty clinic if you don't feel better in the next 2 days.    4. Know when to call 911: Emergency warning signs include:    Trouble breathing or shortness of breath    Pain or pressure in the chest that doesn't go away    Feeling confused like you haven't  felt before, or not being able to wake up    Bluish-colored lips or face    5. Sign up for [employer]. We know it's scary to hear that you have COVID-19. We want to track your symptoms to make sure you're okay over the next 2 weeks. Please look for an email from [employer]--this is a free, online program that we'll use to keep in touch. To sign up, follow the link in the email. Learn more at [email or web address].    Where can I get more information?    [facility]: [email or web address]    Coronavirus Basics: www.health.[facility].[place].us/diseases/coronavirus/basics.html    What to Do If You're Sick: www.cdc.gov/coronavirus/2019-ncov/about/steps-when-sick.html    Ending Home Isolation: www.cdc.gov/coronavirus/2019-ncov/hcp/disposition-in-home-patients.html     Caring for Someone with COVID-19: www.cdc.gov/coronavirus/2019-ncov/if-you-are-sick/care-for-someone.html     [facility] clinical trials (COVID-19 research studies): clinicalaffairs.[facility]-clinical-trials     A Positive COVID-19 letter will be sent via [employer] or the mail. (Exception, no letters sent to Presurgerical/Preprocedure Patients)    [Name]  Nikia Espino RN Triage Nurse Advisor       Additional Information    [1] Other NON-URGENT information for PCP AND [2] does not require PCP response    Protocols used: PCP CALL - NO TRIAGE-A-

## 2020-12-04 ENCOUNTER — ANCILLARY PROCEDURE (OUTPATIENT)
Dept: GENERAL RADIOLOGY | Facility: CLINIC | Age: 55
End: 2020-12-04
Attending: PHYSICIAN ASSISTANT
Payer: COMMERCIAL

## 2020-12-04 ENCOUNTER — ANCILLARY PROCEDURE (OUTPATIENT)
Dept: ULTRASOUND IMAGING | Facility: CLINIC | Age: 55
End: 2020-12-04
Attending: PHYSICIAN ASSISTANT
Payer: COMMERCIAL

## 2020-12-04 ENCOUNTER — OFFICE VISIT (OUTPATIENT)
Dept: FAMILY MEDICINE | Facility: CLINIC | Age: 55
End: 2020-12-04
Payer: COMMERCIAL

## 2020-12-04 VITALS
RESPIRATION RATE: 12 BRPM | SYSTOLIC BLOOD PRESSURE: 128 MMHG | BODY MASS INDEX: 32.1 KG/M2 | WEIGHT: 237 LBS | TEMPERATURE: 98.2 F | DIASTOLIC BLOOD PRESSURE: 68 MMHG | HEART RATE: 73 BPM | OXYGEN SATURATION: 97 % | HEIGHT: 72 IN

## 2020-12-04 DIAGNOSIS — R60.0 EDEMA OF RIGHT LOWER EXTREMITY: ICD-10-CM

## 2020-12-04 DIAGNOSIS — M79.671 RIGHT FOOT PAIN: Primary | ICD-10-CM

## 2020-12-04 PROCEDURE — 99214 OFFICE O/P EST MOD 30 MIN: CPT | Performed by: PHYSICIAN ASSISTANT

## 2020-12-04 PROCEDURE — 73630 X-RAY EXAM OF FOOT: CPT | Mod: RT

## 2020-12-04 ASSESSMENT — ENCOUNTER SYMPTOMS
DIAPHORESIS: 0
PARESTHESIAS: 0
ABDOMINAL PAIN: 0
CHILLS: 0
NERVOUS/ANXIOUS: 0
FEVER: 0
COUGH: 0
SHORTNESS OF BREATH: 0

## 2020-12-04 ASSESSMENT — MIFFLIN-ST. JEOR: SCORE: 1938.02

## 2020-12-04 NOTE — PROGRESS NOTES
Subjective     Dimitris Munoz is a 55 year old male who presents to clinic today for the following health issues:    HPI       Patient notes pain started around 9 days ago at achilles tendon and felt like tendonitis. Pain improved. Patient then developed right heel pain that felt like gout. Patient treated symptoms with Indomethacin which helped. After patient stopped this medication pain and swelling spread to the entire foot. Pain was worse upon initially trying to ambulate. Pain would improve after ambulating for a short period of time. Eventually, pain worsened so much that patient could not weight bear. Patient then restarted Indomethacin which has helped. Swelling has improved and patient is able to bear weight. Pain is now worst at lateral aspect of foot. No known injuries. No calf pain, history of DVT/PE.      Musculoskeletal problem/pain  Onset/Duration: 9 days  Description   Location: heel - right  Joint Swelling: YES  Redness: YES  Pain: YES  Warmth: no  Intensity:  moderate  Progression of Symptoms:  improving  Accompanying signs and symptoms:   Fevers: no  Numbness/tingling/weakness: no  History  Trauma to the area: no  Recent illness:  no  Previous similar problem: YES, hx of gout in R great toe  Previous evaluation:  no  Precipitating or alleviating factors:  Aggravating factors include: standing, walking and weight bearing  Therapies tried and outcome: rest/inactivity and indomethicin 3 doses in last 24 hours- left over from prior gout tx. Tylenol also.    Brittney García CMA      Review of Systems   Constitutional: Negative for chills, diaphoresis and fever.   HENT: Negative for congestion.    Respiratory: Negative for cough and shortness of breath.    Cardiovascular: Negative for chest pain.   Gastrointestinal: Negative for abdominal pain.   Skin: Negative for rash.   Neurological: Negative for paresthesias.   Psychiatric/Behavioral: The patient is not nervous/anxious.             Objective    BP  128/68   Pulse 73   Temp 98.2  F (36.8  C) (Tympanic)   Resp 12   Ht 1.829 m (6')   Wt 107.5 kg (237 lb)   SpO2 97%   BMI 32.14 kg/m    Body mass index is 32.14 kg/m .  Physical Exam  Vitals signs and nursing note reviewed.   Constitutional:       General: He is not in acute distress.     Appearance: Normal appearance.   HENT:      Head: Normocephalic and atraumatic.   Eyes:      Extraocular Movements: Extraocular movements intact.      Pupils: Pupils are equal, round, and reactive to light.   Neck:      Musculoskeletal: Normal range of motion.   Cardiovascular:      Rate and Rhythm: Normal rate and regular rhythm.      Heart sounds: Normal heart sounds.   Pulmonary:      Effort: Pulmonary effort is normal.      Breath sounds: Normal breath sounds.   Musculoskeletal: Normal range of motion.      Right lower leg: Edema (1+ RLE, 2 at right foot) present.      Comments: Right lower externally:  No tenderness to palpation of right ankle or foot  DP pulse 2+  Normal range of motion of right knee, ankle, and foot  No significant ecchymosis, erythema, increased warmth, or signs of abrasion/injury   Skin:     General: Skin is warm and dry.   Neurological:      General: No focal deficit present.      Mental Status: He is alert.   Psychiatric:         Mood and Affect: Mood normal.         Behavior: Behavior normal.            Xray -right foot, 3 views: Per my interpretation, no acute bony abnormalities.   RLE Venous US Pending         Assessment & Plan     Right foot pain  Edema of right lower extremity  Patient is a 55-year-old male who presents to clinic due to right lower extremity pain starting around right Achilles tendon and transitioning into right foot.  Patient also notes swelling of right lower extremity.  X-rays negative for acute bony abnormalities.  There is no tenderness palpation of foot.  Right lower extremity with edema.  Low suspicion for acute infectious etiology as there is no erythema, increased  warmth, signs of injury, and vital signs are normal.  Physical exam findings are concerning for possible DVT.  Will complete lower extremity venous ultrasound for further evaluation.  Discussed symptoms that warrant urgent/emergent follow-up.  Recommended NSAIDs for pain management.  Patient wishes to continue with indomethacin.  Recommended not using this medication for longer than 5-7 days.  - XR Foot Right G/E 3 Views  - US Lower Extremity Venous Duplex Right; Future     See Patient Instructions    Return in about 1 week (around 12/11/2020), or if symptoms worsen or fail to improve.    Laly Serna PA-C  [facility]

## 2020-12-04 NOTE — PATIENT INSTRUCTIONS
Your x-rays do not show any worrisome findings.  Your exam is concerning for possible blood clot in your lower leg.  We will complete an ultrasound for further evaluation.  Your ultrasound is scheduled at the [facility] at 12:45pm today.  Please check in at the .    If you develop fevers, increased warmth to the foot, redness, increased swelling, please follow-up in clinic, urgent care, emergency immediately as this would be concerning for infection.    You may continue to use indomethacin, but do not take it for more than 5-7 days.  Please reach out with any questions or concerns.

## 2020-12-12 ENCOUNTER — HEALTH MAINTENANCE LETTER (OUTPATIENT)
Age: 55
End: 2020-12-12

## 2021-04-11 ENCOUNTER — HEALTH MAINTENANCE LETTER (OUTPATIENT)
Age: 56
End: 2021-04-11

## 2021-08-19 ENCOUNTER — OFFICE VISIT (OUTPATIENT)
Dept: DERMATOLOGY | Facility: CLINIC | Age: 56
End: 2021-08-19
Payer: COMMERCIAL

## 2021-08-19 DIAGNOSIS — L57.0 ACTINIC KERATOSIS: ICD-10-CM

## 2021-08-19 DIAGNOSIS — L57.8 SUN-DAMAGED SKIN: ICD-10-CM

## 2021-08-19 DIAGNOSIS — L82.1 SEBORRHEIC KERATOSIS: ICD-10-CM

## 2021-08-19 DIAGNOSIS — D23.9 DERMATOFIBROMA: ICD-10-CM

## 2021-08-19 DIAGNOSIS — D22.9 MULTIPLE BENIGN NEVI: ICD-10-CM

## 2021-08-19 DIAGNOSIS — L81.4 SOLAR LENTIGO: ICD-10-CM

## 2021-08-19 DIAGNOSIS — L73.8 SENILE SEBACEOUS GLAND HYPERPLASIA: ICD-10-CM

## 2021-08-19 DIAGNOSIS — Z85.828 HISTORY OF NONMELANOMA SKIN CANCER: Primary | ICD-10-CM

## 2021-08-19 DIAGNOSIS — Z80.8 FAMILY HISTORY OF MELANOMA: ICD-10-CM

## 2021-08-19 DIAGNOSIS — D18.01 CHERRY ANGIOMA: ICD-10-CM

## 2021-08-19 DIAGNOSIS — L57.3 POIKILODERMA OF CIVATTE: ICD-10-CM

## 2021-08-19 PROCEDURE — 99213 OFFICE O/P EST LOW 20 MIN: CPT | Mod: 25 | Performed by: DERMATOLOGY

## 2021-08-19 PROCEDURE — 17000 DESTRUCT PREMALG LESION: CPT | Performed by: DERMATOLOGY

## 2021-08-19 NOTE — PATIENT INSTRUCTIONS
Cryotherapy    What is it?    Use of a very cold liquid, such as liquid nitrogen, to freeze and destroy abnormal skin cells that need to be removed    What should I expect?    Tenderness and redness    A small blister that might grow and fill with dark purple blood. There may be crusting.    More than one treatment may be needed if the lesions do not go away.    How do I care for the treated area?    Gently wash the area with your hands when bathing.    Use a thin layer of Vaseline to help with healing. You may use a Band-Aid.     The area should heal within 7-10 days and may leave behind a pink or lighter color.     Do not use an antibiotic or Neosporin ointment.     You may take acetaminophen (Tylenol) for pain.     Call your doctor if you have:    Severe pain    Signs of infection (warmth, redness, cloudy yellow drainage, and or a bad smell)    Questions or concerns    Who should I call with questions?       [facility]: [phone]       [facility]: [phone]       For urgent needs outside of business hours call the [facility] at [phone] and ask for the dermatology resident on call

## 2021-08-19 NOTE — PROGRESS NOTES
[facility] Dermatology Note  Encounter Date: Aug 19, 2021  Office Visit     Dermatology Problem List:  Last skin check 21, recommended yearly.  1. Family history of melanoma (father, ; paternal aunt)  2. BCC, right lateral neck, s/p excision 2018  3. Actinic Keratosis, s/p cryotherapy     Social History: Works as an  for [employer], rides bike to work. Has a cabin he spends time at. Wears sunscreen fairly diligently.  Family History: Father () and paternal aunt had melanoma.  ____________________________________________    ASSESSMENT/PLAN:    # History of NMSC. No evidence of recurrence.   - Recommend sunscreens SPF #30 or greater, protective clothing and avoidance of tanning beds.   - Recommended yearly skin exams.    # Family history of melanoma in a first degree relative - father (and paternal aunt)  - Recommended yearly skin checks.  - Discussed appropriate sun protective measured including sunscreen with an SPF 30 or above.    # Sun damaged skin with solar lentigines, poikiloderma of civatte changes. Chronic. Stable.   - Recommended sunscreens SPF #30 or greater, protective clothing and avoidance of tanning beds.     # Benign findings include: seborrheic keratoses, cherry angioma, sebaceous hyperplasia, dermatofibroma. Self limited, minor.   - No further intervention required. Patient to report changes.  - Patient reassured of the benign nature of these lesions.    # Multiple clinically benign nevi. Chronic. Stable.   - No further intervention required. Patient to report changes.  - Patient reassured of the benign nature of these lesions.    # AK x 1. Discussed precancerous nature of these lesions. Recommended treatment to prevent progression to a squamous cell carcinoma. Advised patient to call for follow up if not resolved in 1 month.   - See procedure note below.     Procedures Performed:   - Cryotherapy procedure note, location: right forehead. After  verbal consent and discussion of risks and benefits including, but not limited to, dyspigmentation/scar, blister, and pain, 1 AK was treated with 1-2 mm freeze border for 1-2 cycles with liquid nitrogen. Post cryotherapy instructions were provided.    Follow-up: 1 year in-person for skin check, or earlier for new or changing lesions.    Staff and Scribe:     Scribe Disclosure:   I, Elise Merrill, am serving as a scribe to document services personally performed by this physician, Dr. Jen Vega, based on data collection and the provider's statements to me.     Provider Disclosure:   The documentation recorded by the scribe accurately reflects the services I personally performed and the decisions made by me.    Jen Vega MD    Department of Dermatology  [facility]: Phone: [phone], Fax:[phone]  [facility]: Phone: [phone], Fax: [phone]    ____________________________________________    CC: Skin Check (Full skin check. No area of concern. Hx of BCC, family hx of MM. Not immunosuppressed )    HPI:  Mr. Dimitris Munoz is a(n) 55 year old male who presents today as a return patient for skin check.    Last seen 8/20/2020. At that time, cryotherapy was performed on 4 AKs.    Today patient notes no areas of concern.    Patient is otherwise feeling well, without additional concerns.    Labs:  NA    Physical exam:  Vitals: There were no vitals taken for this visit.  SKIN: Total skin excluding the undergarment areas was performed. The exam included the head/face, neck, both arms, chest, back, abdomen, buttocks, both legs, digits and/or nails. Declined genital exam.  - Gay Type II  - Scattered brown macules on sun exposed areas.  - There are dome shaped bright red papules on the trunk  - Multiple regular brown pigmented macules and papules are identified on the  face and trunk. Less than 40 on exam. On the left side of the nose, there are three dermal nevi.  - There are waxy stuck on tan to brown papules on the trunk.  - Well healed scar on the right lateral neck.  - There is an erythematous macule with overyling adherent scale on the right forehead x1.  - There are yellow oily papules with central umbilication located on the face.  - Poikiloderma of civatte on the neck  - There is a firm tan/flesh colored papule that dimples with lateral pressure on the right ankle x1.  - No other lesions of concern on areas examined.     Medications:  Current Outpatient Medications   Medication     IBUPROFEN PO     Omeprazole (PRILOSEC PO)     fluticasone (FLONASE) 50 MCG/ACT nasal spray     guaiFENesin (ROBITUSSIN) 100 MG/5ML liquid     No current facility-administered medications for this visit.      Past Medical History:   Patient Active Problem List   Diagnosis     CARDIOVASCULAR SCREENING; LDL GOAL LESS THAN 160     High triglycerides     FH: melanoma     Past Medical History:   Diagnosis Date     CARDIOVASCULAR SCREENING; LDL GOAL LESS THAN 160         CC No referring provider defined for this encounter. on close of this encounter.

## 2021-08-19 NOTE — LETTER
2021         RE: Dimitris Munoz  [id number] [address] [facility] [id number]        Dear Colleague,    Thank you for referring your patient, Dimitris Munoz, to the [facility]. Please see a copy of my visit note below.    [facility] Dermatology Note  Encounter Date: Aug 19, 2021  Office Visit     Dermatology Problem List:  Last skin check 21, recommended yearly.  1. Family history of melanoma (father, ; paternal aunt)  2. BCC, right lateral neck, s/p excision 2018  3. Actinic Keratosis, s/p cryotherapy     Social History: Works as an  for [employer] rides bike to work. Has a cabin he spends time at. Wears sunscreen fairly diligently.  Family History: Father () and paternal aunt had melanoma.  ____________________________________________    ASSESSMENT/PLAN:    # History of NMSC. No evidence of recurrence.   - Recommend sunscreens SPF #30 or greater, protective clothing and avoidance of tanning beds.   - Recommended yearly skin exams.    # Family history of melanoma in a first degree relative - father (and paternal aunt)  - Recommended yearly skin checks.  - Discussed appropriate sun protective measured including sunscreen with an SPF 30 or above.    # Sun damaged skin with solar lentigines, poikiloderma of civatte changes. Chronic. Stable.   - Recommended sunscreens SPF #30 or greater, protective clothing and avoidance of tanning beds.     # Benign findings include: seborrheic keratoses, cherry angioma, sebaceous hyperplasia, dermatofibroma. Self limited, minor.   - No further intervention required. Patient to report changes.  - Patient reassured of the benign nature of these lesions.    # Multiple clinically benign nevi. Chronic. Stable.   - No further intervention required. Patient to report changes.  - Patient reassured of the benign nature of these lesions.    # AK x 1. Discussed precancerous nature of these lesions.  Recommended treatment to prevent progression to a squamous cell carcinoma. Advised patient to call for follow up if not resolved in 1 month.   - See procedure note below.     Procedures Performed:   - Cryotherapy procedure note, location: right forehead. After verbal consent and discussion of risks and benefits including, but not limited to, dyspigmentation/scar, blister, and pain, 1 AK was treated with 1-2 mm freeze border for 1-2 cycles with liquid nitrogen. Post cryotherapy instructions were provided.    Follow-up: 1 year in-person for skin check, or earlier for new or changing lesions.    Staff and Scribe:     Scribe Disclosure:   I, Elise Merrill, am serving as a scribe to document services personally performed by this physician, Dr. Jen Vega, based on data collection and the provider's statements to me.     Provider Disclosure:   The documentation recorded by the scribe accurately reflects the services I personally performed and the decisions made by me.    Jen Vega MD    Department of Dermatology  [facility]: Phone: [phone], Fax:[phone]  [facility]: Phone: [phone], Fax: [phone]    ____________________________________________    CC: Skin Check (Full skin check. No area of concern. Hx of BCC, family hx of MM. Not immunosuppressed )    HPI:  Mr. Dimitris Munoz is a(n) 55 year old male who presents today as a return patient for skin check.    Last seen 8/20/2020. At that time, cryotherapy was performed on 4 AKs.    Today patient notes no areas of concern.    Patient is otherwise feeling well, without additional concerns.    Labs:  NA    Physical exam:  Vitals: There were no vitals taken for this visit.  SKIN: Total skin excluding the undergarment areas was performed. The exam included the head/face, neck, both arms, chest, back, abdomen, buttocks,  both legs, digits and/or nails. Declined genital exam.  - Gay Type II  - Scattered brown macules on sun exposed areas.  - There are dome shaped bright red papules on the trunk  - Multiple regular brown pigmented macules and papules are identified on the face and trunk. Less than 40 on exam. On the left side of the nose, there are three dermal nevi.  - There are waxy stuck on tan to brown papules on the trunk.  - Well healed scar on the right lateral neck.  - There is an erythematous macule with overyling adherent scale on the right forehead x1.  - There are yellow oily papules with central umbilication located on the face.  - Poikiloderma of civatte on the neck  - There is a firm tan/flesh colored papule that dimples with lateral pressure on the right ankle x1.  - No other lesions of concern on areas examined.     Medications:  Current Outpatient Medications   Medication     IBUPROFEN PO     Omeprazole (PRILOSEC PO)     fluticasone (FLONASE) 50 MCG/ACT nasal spray     guaiFENesin (ROBITUSSIN) 100 MG/5ML liquid     No current facility-administered medications for this visit.      Past Medical History:   Patient Active Problem List   Diagnosis     CARDIOVASCULAR SCREENING; LDL GOAL LESS THAN 160     High triglycerides     FH: melanoma     Past Medical History:   Diagnosis Date     CARDIOVASCULAR SCREENING; LDL GOAL LESS THAN 160         CC No referring provider defined for this encounter. on close of this encounter.      Again, thank you for allowing me to participate in the care of your patient.        Sincerely,        Jen Vega MD

## 2021-08-19 NOTE — NURSING NOTE
Dimitris Munoz's goals for this visit include:   Chief Complaint   Patient presents with     Skin Check     Full skin check. No area of concern. Hx of BCC, family hx of MM. Not immunosuppressed        He requests these members of his care team be copied on today's visit information:     PCP: Byron Modi    Referring Provider:  No referring provider defined for this encounter.    There were no vitals taken for this visit.    Do you need any medication refills at today's visit? Kelsey Dodson on 8/19/2021 at 2:02 PM

## 2021-09-26 ENCOUNTER — HEALTH MAINTENANCE LETTER (OUTPATIENT)
Age: 56
End: 2021-09-26

## 2021-11-30 ENCOUNTER — OFFICE VISIT (OUTPATIENT)
Dept: FAMILY MEDICINE | Facility: CLINIC | Age: 56
End: 2021-11-30
Payer: COMMERCIAL

## 2021-11-30 VITALS
SYSTOLIC BLOOD PRESSURE: 132 MMHG | WEIGHT: 241 LBS | HEART RATE: 55 BPM | TEMPERATURE: 97.4 F | RESPIRATION RATE: 18 BRPM | DIASTOLIC BLOOD PRESSURE: 75 MMHG | BODY MASS INDEX: 32.64 KG/M2 | OXYGEN SATURATION: 96 % | HEIGHT: 72 IN

## 2021-11-30 DIAGNOSIS — M79.674 GREAT TOE PAIN, RIGHT: ICD-10-CM

## 2021-11-30 DIAGNOSIS — Z29.9 PREVENTIVE MEASURE: ICD-10-CM

## 2021-11-30 DIAGNOSIS — E78.1 HIGH TRIGLYCERIDES: ICD-10-CM

## 2021-11-30 DIAGNOSIS — Z00.00 ROUTINE GENERAL MEDICAL EXAMINATION AT A HEALTH CARE FACILITY: Primary | ICD-10-CM

## 2021-11-30 DIAGNOSIS — Z23 HIGH PRIORITY FOR 2019-NCOV VACCINE: ICD-10-CM

## 2021-11-30 DIAGNOSIS — M10.9 ACUTE GOUTY ARTHRITIS: ICD-10-CM

## 2021-11-30 DIAGNOSIS — Z12.5 SCREENING FOR PROSTATE CANCER: ICD-10-CM

## 2021-11-30 DIAGNOSIS — Z83.3 FH: TYPE 2 DIABETES: ICD-10-CM

## 2021-11-30 LAB
ALBUMIN SERPL-MCNC: 3.6 G/DL (ref 3.4–5)
ALP SERPL-CCNC: 72 U/L (ref 40–150)
ALT SERPL W P-5'-P-CCNC: 41 U/L (ref 0–70)
ANION GAP SERPL CALCULATED.3IONS-SCNC: 3 MMOL/L (ref 3–14)
AST SERPL W P-5'-P-CCNC: 17 U/L (ref 0–45)
BILIRUB SERPL-MCNC: 0.8 MG/DL (ref 0.2–1.3)
BUN SERPL-MCNC: 12 MG/DL (ref 7–30)
CALCIUM SERPL-MCNC: 8.9 MG/DL (ref 8.5–10.1)
CHLORIDE BLD-SCNC: 108 MMOL/L (ref 94–109)
CHOLEST SERPL-MCNC: 172 MG/DL
CO2 SERPL-SCNC: 29 MMOL/L (ref 20–32)
CREAT SERPL-MCNC: 1.16 MG/DL (ref 0.66–1.25)
FASTING STATUS PATIENT QL REPORTED: YES
GFR SERPL CREATININE-BSD FRML MDRD: 69 ML/MIN/1.73M2
GLUCOSE BLD-MCNC: 99 MG/DL (ref 70–99)
HDLC SERPL-MCNC: 30 MG/DL
LDLC SERPL CALC-MCNC: 67 MG/DL
NONHDLC SERPL-MCNC: 142 MG/DL
POTASSIUM BLD-SCNC: 4.3 MMOL/L (ref 3.4–5.3)
PROT SERPL-MCNC: 6.8 G/DL (ref 6.8–8.8)
PSA SERPL-MCNC: 1.13 UG/L (ref 0–4)
SODIUM SERPL-SCNC: 140 MMOL/L (ref 133–144)
TRIGL SERPL-MCNC: 377 MG/DL
URATE SERPL-MCNC: 7.2 MG/DL (ref 3.5–7.2)

## 2021-11-30 PROCEDURE — 80061 LIPID PANEL: CPT | Performed by: FAMILY MEDICINE

## 2021-11-30 PROCEDURE — 84550 ASSAY OF BLOOD/URIC ACID: CPT | Performed by: FAMILY MEDICINE

## 2021-11-30 PROCEDURE — 80053 COMPREHEN METABOLIC PANEL: CPT | Performed by: FAMILY MEDICINE

## 2021-11-30 PROCEDURE — 0004A COVID-19,PF,PFIZER (12+ YRS): CPT | Performed by: FAMILY MEDICINE

## 2021-11-30 PROCEDURE — G0103 PSA SCREENING: HCPCS | Performed by: FAMILY MEDICINE

## 2021-11-30 PROCEDURE — 99396 PREV VISIT EST AGE 40-64: CPT | Performed by: FAMILY MEDICINE

## 2021-11-30 PROCEDURE — 91300 COVID-19,PF,PFIZER (12+ YRS): CPT | Performed by: FAMILY MEDICINE

## 2021-11-30 PROCEDURE — 36415 COLL VENOUS BLD VENIPUNCTURE: CPT | Performed by: FAMILY MEDICINE

## 2021-11-30 RX ORDER — INDOMETHACIN 50 MG/1
50 CAPSULE ORAL 3 TIMES DAILY PRN
Qty: 60 CAPSULE | Refills: 1 | Status: SHIPPED | OUTPATIENT
Start: 2021-11-30 | End: 2022-09-16

## 2021-11-30 ASSESSMENT — ENCOUNTER SYMPTOMS
DIARRHEA: 0
FREQUENCY: 1
ARTHRALGIAS: 0
COUGH: 0
DIZZINESS: 0
SORE THROAT: 0
DYSURIA: 0
ABDOMINAL PAIN: 0
JOINT SWELLING: 0
EYE PAIN: 0
NAUSEA: 0
MYALGIAS: 0
WEAKNESS: 0
HEADACHES: 0
CHILLS: 0
FEVER: 0
SHORTNESS OF BREATH: 0
PARESTHESIAS: 0
NERVOUS/ANXIOUS: 0
HEMATURIA: 0
HEARTBURN: 0
PALPITATIONS: 1
CONSTIPATION: 0
HEMATOCHEZIA: 0

## 2021-11-30 ASSESSMENT — MIFFLIN-ST. JEOR: SCORE: 1951.17

## 2021-11-30 ASSESSMENT — PAIN SCALES - GENERAL: PAINLEVEL: NO PAIN (0)

## 2021-11-30 NOTE — PROGRESS NOTES
SUBJECTIVE:   CC: Dimitris Munoz is an 56 year old male who presents for preventative health visit.       Patient has been advised of split billing requirements and indicates understanding: Yes  Healthy Habits:     Getting at least 3 servings of Calcium per day:  NO    Bi-annual eye exam:  Yes    Dental care twice a year:  Yes    Sleep apnea or symptoms of sleep apnea:  Daytime drowsiness    Diet:  Regular (no restrictions)    Frequency of exercise:  1 day/week    Duration of exercise:  15-30 minutes    Taking medications regularly:  Yes    Medication side effects:  Not applicable    PHQ-2 Total Score: 0              Today's PHQ-2 Score:   PHQ-2 ( 1999 Pfizer) 11/30/2021   Q1: Little interest or pleasure in doing things 0   Q2: Feeling down, depressed or hopeless 0   PHQ-2 Score 0   PHQ-2 Total Score (12-17 Years)- Positive if 3 or more points; Administer PHQ-A if positive -   Q1: Little interest or pleasure in doing things Not at all   Q2: Feeling down, depressed or hopeless Not at all   PHQ-2 Score 0       Abuse: Current or Past(Physical, Sexual or Emotional)- No  Do you feel safe in your environment? Yes        Social History     Tobacco Use     Smoking status: Never Smoker     Smokeless tobacco: Never Used   Substance Use Topics     Alcohol use: Yes     Comment: 10 beers per week     If you drink alcohol do you typically have >3 drinks per day or >7 drinks per week? No    Alcohol Use 11/30/2021   Prescreen: >3 drinks/day or >7 drinks/week? No   Prescreen: >3 drinks/day or >7 drinks/week? -       Last PSA:   PSA   Date Value Ref Range Status   06/23/2009 0.78 0 - 4 ug/L Final       Reviewed orders with patient. Reviewed health maintenance and updated orders accordingly -       Reviewed and updated as needed this visit by clinical staff  Tobacco  Allergies  Meds   Med Hx  Surg Hx  Fam Hx  Soc Hx       Reviewed and updated as needed this visit by Provider  Tobacco                  Review of Systems    Constitutional: Negative for chills and fever.   HENT: Positive for hearing loss. Negative for congestion, ear pain and sore throat.    Eyes: Negative for pain and visual disturbance.   Respiratory: Negative for cough and shortness of breath.    Cardiovascular: Positive for palpitations. Negative for chest pain and peripheral edema.   Gastrointestinal: Negative for abdominal pain, constipation, diarrhea, heartburn, hematochezia and nausea.   Genitourinary: Positive for frequency and impotence. Negative for dysuria, genital sores, hematuria, penile discharge and urgency.   Musculoskeletal: Negative for arthralgias, joint swelling and myalgias.   Skin: Negative for rash.   Neurological: Negative for dizziness, weakness, headaches and paresthesias.   Psychiatric/Behavioral: Negative for mood changes. The patient is not nervous/anxious.          OBJECTIVE:   /75   Pulse 55   Temp 97.4  F (36.3  C) (Tympanic)   Resp 18   Ht 1.829 m (6')   Wt 109.3 kg (241 lb)   SpO2 96%   BMI 32.69 kg/m      Physical Exam          ASSESSMENT/PLAN:       ICD-10-CM    1. Routine general medical examination at a health care facility  Z00.00 PSA, screen     PSA, screen   2. Great toe pain, right  M79.674 indomethacin (INDOCIN) 50 MG capsule     Comprehensive metabolic panel (BMP + Alb, Alk Phos, ALT, AST, Total. Bili, TP)     Uric acid     Uric acid     Comprehensive metabolic panel (BMP + Alb, Alk Phos, ALT, AST, Total. Bili, TP)   3. Acute gouty arthritis  M10.9 indomethacin (INDOCIN) 50 MG capsule   4. Screening for prostate cancer  Z12.5    5. Preventive measure  Z29.9 aspirin (ASA) 81 MG EC tablet   6. FH: type 2 diabetes  Z83.3 Comprehensive metabolic panel (BMP + Alb, Alk Phos, ALT, AST, Total. Bili, TP)     Comprehensive metabolic panel (BMP + Alb, Alk Phos, ALT, AST, Total. Bili, TP)   7. High triglycerides  E78.1 Lipid panel reflex to direct LDL Fasting     Lipid panel reflex to direct LDL Fasting   8. High priority  for 2019-nCoV vaccine  Z23        Patient has been advised of split billing requirements and indicates understanding: Yes  COUNSELING:   Reviewed preventive health counseling, as reflected in patient instructions       Regular exercise       Healthy diet/nutrition       Vision screening       Aspirin prophylaxis        Family planning       HIV screeninx in teen years, 1x in adult years, and at intervals if high risk       Colon cancer screening       Prostate cancer screening       Osteoporosis prevention/bone health    Estimated body mass index is 32.69 kg/m  as calculated from the following:    Height as of this encounter: 1.829 m (6').    Weight as of this encounter: 109.3 kg (241 lb).     Weight management plan: Discussed healthy diet and exercise guidelines    He reports that he has never smoked. He has never used smokeless tobacco.      Counseling Resources:  ATP IV Guidelines  Pooled Cohorts Equation Calculator  FRAX Risk Assessment  ICSI Preventive Guidelines  Dietary Guidelines for Americans,   [employer]'s MyPlate  ASA Prophylaxis  Lung CA Screening    Dimitris Patel MD  [facility]  --------------------------------------------------------------------------------------------------------------------------------------  SUBJECTIVE:  Dimitris Munoz is a 56 year old male who presents to the clinic today for a routine physical exam.    The patient's last physical was 18    Cholesterol   Date Value Ref Range Status   2018 127 <200 mg/dL Final   07/15/2015 125 <200 mg/dL Final     Comment:     LDL Cholesterol is the primary guide to therapy.   The NCEP recommends further evaluation of: patients with cholesterol greater   than 200 mg/dL if additional risk factors are present, cholesterol greater   than   240 mg/dL, triglycerides greater than 150 mg/dL, or HDL less than 40 mg/dL.       HDL Cholesterol   Date Value Ref Range Status   2018 27 (L) >39 mg/dL Final    07/15/2015 23 (L) >40 mg/dL Final     LDL Cholesterol Calculated   Date Value Ref Range Status   08/09/2018 65 <100 mg/dL Final     Comment:     Desirable:       <100 mg/dl   07/15/2015 60 0 - 129 mg/dL Final     Comment:     LDL Cholesterol is the primary guide to therapy: LDL-cholesterol goal in high   risk patients is <100 mg/dL and in very high risk patients is <70 mg/dL.       Triglycerides   Date Value Ref Range Status   08/09/2018 177 (H) <150 mg/dL Final     Comment:     Borderline high:  150-199 mg/dl  High:             200-499 mg/dl  Very high:       >499 mg/dl  Fasting specimen     07/15/2015 212 (H) 0 - 150 mg/dL Final     Comment:     Fasting specimen     Cholesterol/HDL Ratio   Date Value Ref Range Status   07/15/2015 5.4 (H) 0.0 - 5.0 Final   06/16/2015 8.5 (A) 0 - 5 Final     The patient's last fasting lipid panel was done 3 years ago and the results are listed above.        The ASCVD Risk score (Onyx ELVIN Jr., et al., 2013) failed to calculate for the following reasons:    The valid total cholesterol range is 130 to 320 mg/dL      Risk Enhancers:  Family history of premature ASCVD- No  LDL >159- Unknown  Chronic kidney disease- No  Metabolic Syndrome- No  Premature menopause- No  Inflammatory conditions (RA, psoriasis, HIV)- No  SE  Ancestry- No  Triglycerides >174- Unknown      The patient reports that he has never been treated for high blood pressure.    The patient reports that he does not take a daily aspirin.    Lab Results   Component Value Date    HCVAB Nonreactive 08/09/2018     The patient reports that he has been screened for Hepatitis C    (Screen all baby boomers once per CDC-- the generation born from 1945 through 1965 and per USPTF screen age 19 to 79 especially younger people who have used IV drugs)  He would not like to have an Hepatitis C test today    No results found for: SARAYB  The patient reports that he has not been screened for HIV   (Screen all 15 to 64 years old)  He  would not like to have an HIV test today      Immunization History   Administered Date(s) Administered     COVID-19,PF,Pfizer (12+ Yrs) 04/23/2021, 05/15/2021, 11/10/2021     Influenza (IIV3) PF 11/19/2012     Influenza Vaccine IM > 6 months Valent IIV4 (Alfuria,Fluzone) 09/25/2018, 11/10/2021     TDAP Vaccine (Adacel) 05/29/2014     The patient's believes that his last tetanus shot was given 7 year(s) ago.   The patient believes that he has not had a Shingrix in the past  The patient believes that he has not had a PPSV23 in the past.  The patient believes that he has not had a PCV13 in the past.  The patient believes that he has had a seasonal flu vaccination this fall or winter.  The patient would like to have a COVID  vaccinations today. He reports that he was not actually given the Covid vaccination(s) on 11-10-21 because it was 5 day(s) too early.       Results for orders placed or performed during the hospital encounter of 04/02/15   COLONOSCOPY   Result Value Ref Range    COLONOSCOPY       [facility]  Endoscopy Department-[place]  _______________________________________________________________________________  Patient Name: Dimitris Munoz          Procedure Date: 4/2/2015 9:15 AM  MRN: [id number]                       YOB: 1965  Admit Type: Outpatient                Age: 49  Gender: Male                          Note Status: Finalized  Attending MD: Alfredo Kowalski MD       _______________________________________________________________________________     Procedure:                Colonoscopy  Indications:              Screening for colorectal malignant neoplasm  Providers:                Alfredo Kowalski MD, Svetlana Daniels RN  Referring MD:             Byron Modi MD  Medicines:                Fentanyl 100 micrograms IV, Midazolam 2 mg IV,                             Diphenhydramine 12.5 mg IV  Complications:            No immediate  complications.  ___________________________________________________________________ ____________  Procedure:                Pre-Anesthesia Assessment:                            - Prior to the procedure, a History and Physical                             was performed, and patient medications and                             allergies were reviewed. The patient is competent.                             The risks and benefits of the procedure and the                             sedation options and risks were discussed with the                             patient. All questions were answered and informed                             consent was obtained. Patient identification and                             proposed procedure were verified by the physician                             in the procedure room. Mental Status Examination:                             alert and oriented. Airway Examination: normal                             oropharyngeal airway and neck mobility. Respiratory                             Examination: clear to auscultation. CV Examination:                              normal. Prophylactic Antibiotics: The patient does                             not require prophylactic antibiotics. Prior                             Anticoagulants: The patient has taken no previous                             anticoagulant or antiplatelet agents. ASA Grade                             Assessment: II - A patient with mild systemic                             disease. After reviewing the risks and benefits,                             the patient was deemed in satisfactory condition to                             undergo the procedure. The anesthesia plan was to                             use moderate sedation / analgesia (conscious                             sedation). Immediately prior to administration of                             medications, the patient was re-assessed for                              adequacy to receive sedatives. The heart rate,                             respiratory rate, oxygen saturations, blood                              pressure, adequacy of pulmonary ventilation, and                             response to care were monitored throughout the                             procedure. The physical status of the patient was                             re-assessed after the procedure.                            After obtaining informed consent, the colonoscope                             was passed under direct vision. Throughout the                             procedure, the patient's blood pressure, pulse, and                             oxygen saturations were monitored continuously. The                             Colonoscope was introduced through the anus and                             advanced to the cecum, identified by appendiceal                             orifice and ileocecal valve. The colonoscopy was                             performed without difficulty. The patient tolerated                             the procedure well. The quality of the bowel                             prepara tion was good.                                                                                   Findings:       The perianal and digital rectal examinations were normal.       Multiple small and large-mouthed diverticula were found in the sigmoid        colon and in the descending colon.       The exam was otherwise without abnormality.                                                                                   Impression:               - Diverticulosis in the sigmoid colon and in the                             descending colon.                            - The examination was otherwise normal.                            diverticulum start around 70 cm  Recommendation:           - Will need to start on Metamucil or its equivalent                             to help reduce diverticulum in  the future. See                             patient information for more details.                            next colonoscopy will be in 10 years.                                                                                      ___________________  Alfredo Kowalski MD  4/2/2015 10:07 AM                              Number of Addenda: 0    Note Initiated On: 4/2/2015 9:15 AM  Scope Withdrawal Time: 0 hours 0 minutes 0 seconds   Total Procedure Duration: 0 hours 0 minutes 0 seconds      ]   The patient denies a family history of colon cancer.  The patient reports that he has had a colonoscopy. His  last colonoscopy was in 2015 and he  report that is was normal. The patient was told to have this repeated in 10 years.      His currently used contraception is  a vasectomy.  The patient reports a family history of diabetes in a sister.  The patient denies a family history of prostate cancer. After discussing the pros and cons of checking a PSA he  Does want to have this test drawn today.   The patient reports that he eats or drinks 0 servings of dairy products per day. He eats greens frequently. He does not take a calcium supplement at all.  The patient reports that he has dental appointments approximately every 6 months.  The patient reports that he  has an eye examination approximately every 1.0 year(s).    Do you currently smoke? No  How many years have you smoked? 0   How many packs per day did you smoke on average? N/A  (if more than 30 pack year history and the patient is age 55-80 consider ordering an annual low dose radiation lung CT to screen for cancer)  (Do not order if patient has quit more than 15 years ago or has a health condition that limits life expectancy or could not tolerate curative lung surgery)  Are you interested having a lung CT to screen for lung cancer? N/A    If the patient has smoked more that 100 cigarettes, has the patient had an imaging study (US or CT) for an AAA between the ages of 65  and 75? N/A            Patient Active Problem List   Diagnosis     CARDIOVASCULAR SCREENING; LDL GOAL LESS THAN 160     High triglycerides     FH: melanoma       Past Surgical History:   Procedure Laterality Date     COLONOSCOPY WITH CO2 INSUFFLATION N/A 4/2/2015    Procedure: COLONOSCOPY WITH CO2 INSUFFLATION;  Surgeon: Alfredo Kowalski MD;  Location: [facility] OR     HERNIA REPAIR         Family History   Problem Relation Age of Onset     Cancer Father 53        skin cancer       Social History     Socioeconomic History     Marital status:      Spouse name: Not on file     Number of children: Not on file     Years of education: Not on file     Highest education level: Not on file   Occupational History     Not on file   Tobacco Use     Smoking status: Never Smoker     Smokeless tobacco: Never Used   Vaping Use     Vaping Use: Never used   Substance and Sexual Activity     Alcohol use: Yes     Comment: 10 beers per week     Drug use: No     Sexual activity: Yes     Partners: Female   Other Topics Concern     Parent/sibling w/ CABG, MI or angioplasty before 65F 55M? No   Social History Narrative     Not on file     Social Determinants of Health     Financial Resource Strain: Not on file   Food Insecurity: Not on file   Transportation Needs: Not on file   Physical Activity: Not on file   Stress: Not on file   Social Connections: Not on file   Intimate Partner Violence: Not on file   Housing Stability: Not on file       Current Outpatient Medications   Medication Sig Dispense Refill     IBUPROFEN PO Take 200 mg by mouth 3 times daily 400 to 600 mg       Omeprazole (PRILOSEC PO)          PHYSICAL EXAMINATION:  Blood pressure 132/75, pulse 55, temperature 97.4  F (36.3  C), temperature source Tympanic, resp. rate 18, height 1.829 m (6'), weight 109.3 kg (241 lb), SpO2 96 %.  General appearance - healthy, alert and no distress  Skin - Skin color, texture, turgor normal. No rashes or lesions.  Head - Normocephalic.  No masses, lesions, tenderness or abnormalities  Eyes - conjunctivae/corneas clear. PERRL, EOM's intact. Fundi benign  Ears - External ears normal. Canals clear. TM's normal.  Nose/Sinuses - Nares normal. Septum midline. Mucosa normal. No drainage or sinus tenderness.  Oropharynx - Lips, mucosa, and tongue normal. Teeth and gums normal.  Neck - Neck supple. No adenopathy. Thyroid symmetric, normal size,  Lungs - Percussion normal. Good diaphragmatic excursion. Lungs clear  Heart - PMI normal. No lifts, heaves, or thrills. RRR. No murmurs, clicks gallops or rub  Abdomen - Abdomen soft, non-tender. BS normal. No masses, organomegaly  Extremities - Extremities normal. No deformities, edema, or skin discoloration.  Musculoskeletal - Spine ROM normal. Muscular strength intact.  Peripheral pulses - radial=4/4, femoral=4/4, popliteal=4/4, dorsalis pedis=4/4,  Neuro - Gait normal. Reflexes normal and symmetric. Sensation grossly WNL.  Genitalia - Penis normal. No urethral discharge. Scrotum normal to palpation. No hernia.  Rectal - Rectal negative. Prostate palpation negative.  No rectal masses or abnormalities      Orders Only on 11/02/2020   Component Date Value Ref Range Status     COVID-19 Virus PCR to [facility]* 11/02/2020 Nasopharyngeal   Final     COVID-19 Virus PCR to [facility] - Re* 11/02/2020 Detected, Abnormal Result*  Final    Comment: Positive for 2019-nCoV.  Patient sample was heat inactivated and amplified using the HDPCR SARS-CoV-2   assay ([employer] The HDPCRTM SARS-CoV-2 assay is a reverse   transcription real-time polymerase chain reaction (qRT-PCR) test intended for   the qualitative detection of nucleic acid  from SARS-CoV-2 in human nasopharyngeal swabs, oropharyngeal swabs, anterior   nasal swabs, mid-turbinate nasal swabs as well as nasal aspirate, nasal wash,   and bronchoalveolar lavage (BAL) specimens from individuals who are suspected   of COVID-19 by their healthcare provider.  Positive  results should also be reported in accordance with local, state, and   federal regulations.  Nasopharyngeal specimen is the preferred choice for swab-based SARS CoV2   testing. When collection of a nasopharyngeal swab is not possible the   following are acceptable alternatives:  an oropharyngeal (OP) specimen collected by a healthcare professional, or a   nasal mid-turbinate (NMT) swab collected by a healthcar                           e professional or by   onsite self-collection (using a flocked tapered swab), or an anterior nares   specimen collected by a healthcare professional or by onsite self-collection   (using a round foam swab). (Centers for Disease Control)  Testing performed by [facility] ([facility]) [address] [facility] [facility] [address]  The test performance characteristics were determined by [facility]. It has not been   cleared or approved by the FDA.  The laboratory is regulated under the Clinical Laboratory Improvement   Amendments of 1988 (CLIA-88) as qualified to perform high-complexity testing.   This test is used for clinical purposes. It should not be regarded as   investigational or for research.         ASSESSMENT:    ICD-10-CM    1. Routine general medical examination at a health care facility  Z00.00    2. Great toe pain, right  M79.674    3. Acute gouty arthritis  M10.9        Well-Adult Physical Exam.  Health Maintenance Due   Topic Date Due     ANNUAL REVIEW OF HM ORDERS  Never done     HIV SCREENING  Never done     ZOSTER IMMUNIZATION (1 of 2) Never done     Health Maintenance   Topic Date Due     ANNUAL REVIEW OF HM ORDERS  Never done     HIV SCREENING  Never done     ZOSTER IMMUNIZATION (1 of 2) Never done     PREVENTIVE CARE VISIT  11/30/2022     LIPID  08/09/2023     DTAP/TDAP/TD IMMUNIZATION (2 - Td or Tdap) 05/29/2024     ADVANCE CARE PLANNING  03/02/2025     COLORECTAL CANCER SCREENING  04/02/2025     HEPATITIS C  SCREENING  Completed     PHQ-2  Completed     INFLUENZA VACCINE  Completed     COVID-19 Vaccine  Completed     Pneumococcal Vaccine: Pediatrics (0 to 5 Years) and At-Risk Patients (6 to 64 Years)  Aged Out     IPV IMMUNIZATION  Aged Out     MENINGITIS IMMUNIZATION  Aged Out     HEPATITIS B IMMUNIZATION  Aged Out         HEALTH CARE MAINTENENCE: The recommended screening tests and vaccinatons for this patient have been discussed as above.  The appropriate tests and vaccinations  have been ordered or declined by the patient. Please see the orders in EPIC.The patient specifically declines: Shingrix today but he will come back for it at the pharmacy     Immunization Status:  up to date and documented except for COVID Booster and Shingrix    Patient Active Problem List   Diagnosis     CARDIOVASCULAR SCREENING; LDL GOAL LESS THAN 160     High triglycerides     FH: melanoma        ATP III Guidelines  ICSI Preventive Guidelines    PLAN:   Check a fasting lipid profile  I recommended to take a daily aspirin (81 to 325 mg)  HIV testing was discussed but the pt declined  Shingrix recommended  COVID booster vaccine recommended  Check a fasting glucose  Check a PSA  Discussed calcium intake, vitamins and supplements. Recommended 1000 mg of calcium daily  Weight loss through diet and exercise was recommended  Sunscreen use was recommended especially in the area of tatoos  Recommended dental exams every 6 months  Recommended eye exam every 1-2 years  Follow up in 1 year for the next preventative medical visit        Body mass index is 32.69 kg/m .    (

## 2021-11-30 NOTE — PATIENT INSTRUCTIONS
Preventive Health Recommendations  Male Ages 50 - 64    Yearly exam:             See your health care provider every year in order to  o   Review health changes.   o   Discuss preventive care.    o   Review your medicines if your doctor has prescribed any.     Have a cholesterol test every 5 years, or more frequently if you are at risk for high cholesterol/heart disease.     Have a diabetes test (fasting glucose) every three years. If you are at risk for diabetes, you should have this test more often.     Have a colonoscopy at age 50, or have a yearly FIT test (stool test). These exams will check for colon cancer.      Talk with your health care provider about whether or not a prostate cancer screening test (PSA) is right for you.    You should be tested each year for STDs (sexually transmitted diseases), if you re at risk.     Shots: Get a flu shot each year. Get a tetanus shot every 10 years.     Nutrition:    Eat at least 5 servings of fruits and vegetables daily.     Eat whole-grain bread, whole-wheat pasta and brown rice instead of white grains and rice.     Get adequate Calcium and Vitamin D.     Lifestyle    Exercise for at least 150 minutes a week (30 minutes a day, 5 days a week). This will help you control your weight and prevent disease.     Limit alcohol to one drink per day.     No smoking.     Wear sunscreen to prevent skin cancer.     See your dentist every six months for an exam and cleaning.     See your eye doctor every 1 to 2 years.      Patient Education     Preventing Osteoporosis: Meeting Your Calcium Needs    Your body needs calcium to build and repair bones. But it can't make calcium on its own. That's why it's important to eat calcium-rich foods. Some foods are naturally rich in calcium. Others have calcium added (fortified). It's best to get calcium from the foods you eat. But if you can't get enough, you may want to take calcium supplements. To meet your daily calcium needs, try the  foods listed below.  Dairy Fish & beans Other sources   Source   Calcium (mg) per serving   Source   Calcium (mg) per serving   Source   Calcium (mg) per serving   Low-fat yogurt, plain   415 mg/8 oz.   Sardines, Atlantic, canned, with bones   351 mg/3 oz.   Oatmeal, instant, fortified   215 mg/1 cup   Nonfat milk   302 mg/1 cup   [place], sockeye, canned, with bones   239 mg/3 oz.   Tofu made with calcium sulfate   204 mg/3 oz.   Low-fat milk   297 mg/1 cup   Soybeans, fresh, boiled   131 mg/1/2 cup   Collards   179 mg/1/2 cup   Swiss cheese   272 mg/1 oz.   White beans, cooked   81 mg/1/2 cup   English muffin, whole wheat   175 mg/1 muffin   Cheddar cheese   205 mg/1 oz.   Navy beans, cooked   79 mg/1/2 cup   Kale   90 mg/1/2 cup   Ice cream strawberry   79 mg/1/2 cup           Orange, navel   56 mg/1 medium   Note: Calcium levels may vary depending on brand and size.  Daily calcium needs  14 to 18 years old: 1,300 mg  19 to 30 years old: 1,000 mg  31 to 50 years old: 1,000 mg  51 to 70 years old, women: 1,200 mg  51 to 70 years old, men: 1,000 mg  Pregnant or nursin to 18 years old: 1,300 mg, 19 to 50 years old: 1,000 mg  Older than 70 (women and men): 1,200 mg   [employer] last reviewed this educational content on 2018-2021 [employer]. All rights reserved. This information is not intended as a substitute for professional medical care. Always follow your healthcare professional's instructions.

## 2021-11-30 NOTE — PROGRESS NOTES
SUBJECTIVE:   CC: Dimitris Munoz is an 56 year old male who presents for preventative health visit.     {Split Bill scripting  The purpose of this visit is to discuss your medical history and prevent health problems before you are sick. You may be responsible for a co-pay, coinsurance, or deductible if your visit today includes services such as checking on a sore throat, having an x-ray or lab test, or treating and evaluating a new or existing condition :539588}  Patient has been advised of split billing requirements and indicates understanding: {Yes and No:091608}  HPI  {Add if <65 person on Medicare  - Required Questions (Optional):791446}  {Outside tests to abstract? :131702}    {additional problems to add (Optional):661003}    Today's PHQ-2 Score:   PHQ-2 ( 1999 Pfizer) 8/19/2021   Q1: Little interest or pleasure in doing things 0   Q2: Feeling down, depressed or hopeless 0   PHQ-2 Score 0   PHQ-2 Total Score (12-17 Years)- Positive if 3 or more points; Administer PHQ-A if positive 0   Q1: Little interest or pleasure in doing things -   Q2: Feeling down, depressed or hopeless -   PHQ-2 Score -       Abuse: Current or Past(Physical, Sexual or Emotional)- { :188991}  Do you feel safe in your environment? { :110576}        Social History     Tobacco Use     Smoking status: Never Smoker     Smokeless tobacco: Never Used   Substance Use Topics     Alcohol use: Yes     Comment: 10 beers per week     {Rooming Staff- Complete this question if Prescreen response is not shown below for today's visit. If you drink alcohol do you typically have >3 drinks per day or >7 drinks per week? (Optional):705510}    Alcohol Use 8/9/2018   Prescreen: >3 drinks/day or >7 drinks/week? No   Prescreen: >3 drinks/day or >7 drinks/week? -   {add AUDIT responses (Optional) (A score of 7 for adult men is an indication of hazardous drinking; a score of 8 or more is an indication of an alcohol use disorder.  A score of 7 or more for adult  "women is an indication of hazardous drinking or an alchohol use disorder):990273}    Last PSA:   PSA   Date Value Ref Range Status   06/23/2009 0.78 0 - 4 ug/L Final       Reviewed orders with patient. Reviewed health maintenance and updated orders accordingly - { :693784::\"Yes\"}  {Chronicprobdata (optional):537347}    Reviewed and updated as needed this visit by clinical staff                Reviewed and updated as needed this visit by Provider               {HISTORY OPTIONS (Optional):407494}    Review of Systems  {MALE ROS (Optional):893459::\"CONSTITUTIONAL: NEGATIVE for fever, chills, change in weight\",\"INTEGUMENTARY/SKIN: NEGATIVE for worrisome rashes, moles or lesions\",\"EYES: NEGATIVE for vision changes or irritation\",\"ENT: NEGATIVE for ear, mouth and throat problems\",\"RESP: NEGATIVE for significant cough or SOB\",\"CV: NEGATIVE for chest pain, palpitations or peripheral edema\",\"GI: NEGATIVE for nausea, abdominal pain, heartburn, or change in bowel habits\",\" male: negative for dysuria, hematuria, decreased urinary stream, erectile dysfunction, urethral discharge\",\"MUSCULOSKELETAL: NEGATIVE for significant arthralgias or myalgia\",\"NEURO: NEGATIVE for weakness, dizziness or paresthesias\",\"PSYCHIATRIC: NEGATIVE for changes in mood or affect\"}    OBJECTIVE:   There were no vitals taken for this visit.    Physical Exam  {Exam Choices (Optional):236045}    {Diagnostic Test Results (Optional):707158::\"Diagnostic Test Results:\",\"Labs reviewed in Epic\"}    ASSESSMENT/PLAN:   {Diag Picklist:855407}    Patient has been advised of split billing requirements and indicates understanding: {YES / NO:659692::\"Yes\"}  COUNSELING:   {MALE COUNSELING MESSAGES:889843::\"Reviewed preventive health counseling, as reflected in patient instructions\"}    Estimated body mass index is 32.14 kg/m  as calculated from the following:    Height as of 12/4/20: 1.829 m (6').    Weight as of 12/4/20: 107.5 kg (237 lb).     {Weight Management Plan " (ACO) Complete if BMI is abnormal-  Ages 18-64  BMI >24.9.  Age 65+ with BMI <23 or >30 (Optional):822109}    He reports that he has never smoked. He has never used smokeless tobacco.      Counseling Resources:  ATP IV Guidelines  Pooled Cohorts Equation Calculator  FRAX Risk Assessment  ICSI Preventive Guidelines  Dietary Guidelines for Americans, 2010  USDA's MyPlate  ASA Prophylaxis  Lung CA Screening    Dimitris Patel MD  [facility]

## 2021-11-30 NOTE — NURSING NOTE
Chief Complaint   Patient presents with     Physical       Initial BP (!) 167/82   Pulse 55   Temp 97.4  F (36.3  C) (Tympanic)   Resp 18   Ht 1.829 m (6')   Wt 109.3 kg (241 lb)   SpO2 96%   BMI 32.69 kg/m   Estimated body mass index is 32.69 kg/m  as calculated from the following:    Height as of this encounter: 1.829 m (6').    Weight as of this encounter: 109.3 kg (241 lb).  Medication Reconciliation: complete  Nadira Gonzalez, CMA

## 2022-08-10 ENCOUNTER — OFFICE VISIT (OUTPATIENT)
Dept: DERMATOLOGY | Facility: CLINIC | Age: 57
End: 2022-08-10
Payer: COMMERCIAL

## 2022-08-10 DIAGNOSIS — D49.2 NEOPLASM OF UNSPECIFIED BEHAVIOR OF BONE, SOFT TISSUE, AND SKIN: ICD-10-CM

## 2022-08-10 DIAGNOSIS — D18.01 CHERRY ANGIOMA: ICD-10-CM

## 2022-08-10 DIAGNOSIS — L57.0 ACTINIC KERATOSIS: ICD-10-CM

## 2022-08-10 DIAGNOSIS — Z85.828 HISTORY OF NONMELANOMA SKIN CANCER: ICD-10-CM

## 2022-08-10 DIAGNOSIS — D22.9 MULTIPLE BENIGN NEVI: ICD-10-CM

## 2022-08-10 DIAGNOSIS — L82.1 SEBORRHEIC KERATOSES: ICD-10-CM

## 2022-08-10 DIAGNOSIS — L81.4 SOLAR LENTIGO: ICD-10-CM

## 2022-08-10 DIAGNOSIS — Z80.8 FAMILY HISTORY OF MELANOMA: Primary | ICD-10-CM

## 2022-08-10 PROCEDURE — 11102 TANGNTL BX SKIN SINGLE LES: CPT | Performed by: PHYSICIAN ASSISTANT

## 2022-08-10 PROCEDURE — 99213 OFFICE O/P EST LOW 20 MIN: CPT | Mod: 25 | Performed by: PHYSICIAN ASSISTANT

## 2022-08-10 PROCEDURE — 17000 DESTRUCT PREMALG LESION: CPT | Mod: XS | Performed by: PHYSICIAN ASSISTANT

## 2022-08-10 PROCEDURE — 88305 TISSUE EXAM BY PATHOLOGIST: CPT | Performed by: DERMATOLOGY

## 2022-08-10 PROCEDURE — 11103 TANGNTL BX SKIN EA SEP/ADDL: CPT | Performed by: PHYSICIAN ASSISTANT

## 2022-08-10 PROCEDURE — 17003 DESTRUCT PREMALG LES 2-14: CPT | Performed by: PHYSICIAN ASSISTANT

## 2022-08-10 ASSESSMENT — PAIN SCALES - GENERAL: PAINLEVEL: NO PAIN (0)

## 2022-08-10 NOTE — PATIENT INSTRUCTIONS
Wound Care After a Biopsy    What is a skin biopsy?  A skin biopsy allows the doctor to examine a very small piece of tissue under the microscope to determine the diagnosis and the best treatment for the skin condition. A local anesthetic (numbing medicine)  is injected with a very small needle into the skin area to be tested. A small piece of skin is taken from the area. Sometimes a suture (stitch) is used.     What are the risks of a skin biopsy?  I will experience scar, bleeding, swelling, pain, crusting and redness. I may experience incomplete removal or recurrence. Risks of this procedure are excessive bleeding, bruising, infection, nerve damage, numbness, thick (hypertrophic or keloidal) scar and non-diagnostic biopsy.    How should I care for my wound for the first 24 hours?  Keep the wound dry and covered for 24 hours  If it bleeds, hold direct pressure on the area for 15 minutes. If bleeding does not stop then go to the emergency room  Avoid strenuous exercise the first 1-2 days or as your doctor instructs you    How should I care for the wound after 24 hours?  After 24 hours, remove the bandage  You may bathe or shower as normal  If you had a scalp biopsy, you can shampoo as usual and can use shower water to clean the biopsy site daily  Clean the wound twice a day with gentle soap and water  Do not scrub, be gentle  Apply white petroleum/Vaseline after cleaning the wound with a cotton swab or a clean finger, and keep the site covered with a Bandaid /bandage. Bandages are not necessary with a scalp biopsy  If you are unable to cover the site with a Bandaid /bandage, re-apply ointment 2-3 times a day to keep the site moist. Moisture will help with healing  Avoid strenuous activity for first 1-2 days  Avoid lakes, rivers, pools, and oceans until the stitches are removed or the site is healed    How do I clean my wound?  Wash hands thoroughly with soap or use hand  before all wound care  Clean the  wound with gentle soap and water  Apply white petroleum/Vaseline  to wound after it is clean  Replace the Bandaid /bandage to keep the wound covered for the first few days or as instructed by your doctor  If you had a scalp biopsy, warm shower water to the area on a daily basis should suffice    What should I use to clean my wound?   Cotton-tipped applicators (Qtips )  White petroleum jelly (Vaseline ). Use a clean new container and use Q-tips to apply.  Bandaids   as needed  Gentle soap     How should I care for my wound long term?  Do not get your wound dirty  Keep up with wound care for one week or until the area is healed.  A small scab will form and fall off by itself when the area is completely healed. The area will be red and will become pink in color as it heals. Sun protection is very important for how your scar will turn out. Sunscreen with an SPF 30 or greater is recommended once the area is healed.  You should have some soreness but it should be mild and slowly go away over several days. Talk to your doctor about using tylenol for pain,    When should I call my doctor?  If you have increased:   Pain or swelling  Pus or drainage (clear or slightly yellow drainage is ok)  Temperature over 100F  Spreading redness or warmth around wound    When will I hear about my results?  The biopsy results can take 2 weeks to come back.  Your results will automatically release to [facility] before your provider has even reviewed them.  The clinic will call you with the results, send you a [employer] message, or have you schedule a follow-up clinic or phone time to discuss the results.  Contact our clinics if you do not hear from us in 2 weeks.    Who should I call with questions?  [facility]: [phone]  [facility]: [phone]  For urgent needs outside of business hours call the [facility] at [phone] and ask for the dermatology resident on  call          Cryotherapy    What is it?  Use of a very cold liquid, such as liquid nitrogen, to freeze and destroy abnormal skin cells that need to be removed    What should I expect?  Tenderness and redness  A small blister that might grow and fill with dark purple blood. There may be crusting.  More than one treatment may be needed if the lesions do not go away.    How do I care for the treated area?  Gently wash the area with your hands when bathing.  Use a thin layer of Vaseline to help with healing. You may use a Band-Aid.   The area should heal within 7-10 days and may leave behind a pink or lighter color.   Do not use an antibiotic or Neosporin ointment.   You may take acetaminophen (Tylenol) for pain.     Call your doctor if you have:  Severe pain  Signs of infection (warmth, redness, cloudy yellow drainage, and or a bad smell)  Questions or concerns    Who should I call with questions?      [facility]: [phone]      [facility]: [phone]      For urgent needs outside of business hours call the [facility] at [phone] and ask for the dermatology resident on call

## 2022-08-10 NOTE — PROGRESS NOTES
[facility] Dermatology Note  Encounter Date: Aug 10, 2022  Office Visit     Dermatology Problem List:  Last skin check 8/10/22, recommended yearly.  1. Family history of melanoma (father, ; paternal aunt)  2. BCC, right lateral neck, s/p excision 2018  3. Actinic Keratosis, s/p cryotherapy   - R cheek has been treated 2x, if not resolved at next visit consider bx  4. NUB - left upper arm and mid back s/p x 8/10/22    Social History: Works as an  for [employer], rides bike to work. Has a cabin he spends time at. Wears sunscreen fairly diligently.  Family History: Father () and paternal aunt had melanoma.  ____________________________________________     ASSESSMENT/PLAN:     # History of NMSC. No evidence of recurrence.   - Recommend sunscreens SPF #30 or greater, protective clothing and avoidance of tanning beds.   - Recommended yearly skin exams.     # Family history of melanoma in a first degree relative - father (and paternal aunt)  - Recommended yearly skin checks.  - Discussed appropriate sun protective measured including sunscreen with an SPF 30 or above.    # Cherry angioma(s).    - No further intervention needed.    # Multiple clinically benign nevi.  - Signs and Symptoms of non-melanoma skin cancer and ABCDEs of melanoma reviewed with patient. Patient encouraged to perform monthly self skin exams and educated on how to perform them. UV precautions reviewed with patient. Patient was asked about new or changing moles/lesions on body.   - Sunscreen: Apply 20 minutes prior to going outdoors and reapply every two hours, when wet or sweating. We recommend using an SPF 30 or higher, and to use one that is water resistant.       - No further intervention needed.     # Seborrheic keratosis, non irritated.   - No further intervention needed.     # Solar lentigines.  - Sunscreen: Apply 20 minutes prior to going outdoors and reapply every two hours, when wet or sweating.  We recommend using an SPF 30 or higher, and to use one that is water resistant.      - No further intervention needed.     # Actinic keratosis - right cheek, mid forehead x3, left cheek  - See cryo note.   - If lesion on right cheek does not resolved with 2nd round of cryo today, will biopsy at next visit.     # Neoplasm of uncertain behavior on the left upper arm. The differential diagnosis includes BCC vs other. .  - See procedure note.     # Neoplasm of uncertain behavior on the mid back. The differential diagnosis includes NMSC vs other. .  - See procedure note.     Procedures Performed:   - Cryotherapy procedure note. After verbal consent and discussion of risks and benefits including, but not limited to, dyspigmentation/scar, blister, and pain, 5 lesion(s) was(were) treated with 1-2 mm freeze border for 1-2 cycles with liquid nitrogen. Post cryotherapy instructions were provided.    - Shave biopsy procedure note, location(s): left upper arm and mid back. After discussion of benefits and risks including but not limited to bleeding, infection, scar, incomplete removal, recurrence, and non-diagnostic biopsy, written consent and photographs were obtained. The area was cleaned with isopropyl alcohol. 0.5mL of 1% lidocaine with epinephrine was injected to obtain adequate anesthesia of lesion(s). Shave biopsy at site(s) performed. Hemostasis was achieved with aluminium chloride. Petrolatum ointment and a sterile dressing were applied. The patient tolerated the procedure and no complications were noted. The patient was provided with verbal and written post care instructions.       Follow-up: pending path results    Staff and Scribe:     Scribe Disclosure:   Alexi LAZCANO, am serving as a scribe to document services personally performed by Brittney Mason PA-C, based on data collection and the provider's statements to me.  Provider Disclosure:   The documentation recorded by the scribe accurately reflects the services  "I personally performed and the decisions made by me.    All risks, benefits and alternatives were discussed with patient.  Patient is in agreement and understands the assessment and plan.  All questions were answered.    Brittney Mason PA-C, MPAS  [facility]: Phone: [phone], Fax: [phone]  [facility]: Phone: [phone],  Fax: [phone]  [facility]: Phone: [phone], Fax: [phone]  ____________________________________________    CC: Skin Check (Fbse, middle of back that wife noticed but not too worried \"but it's different\". Rough spots around hairline but that's normal for these visits)    HPI:  Mr. Dimitris Munoz is a(n) 56 year old male who presents today as a return patient for a skin check. Hx BCC.     Last seen on 8/19/21 with Dr. Vega for a skin check. At that time, AKs were treated with cryo.     Today, patient notes wife and daughter is concern about lesion on the mid back. Has noticed it in early June and is unchanged. Also notes concern on the hair line and right cheek. States lesion on the right cheek tends to flake and temporarily resolved with shaving. Notes dryness around the nose.     Patient is otherwise feeling well, without additional concerns.    Labs:  NA    Physical Exam:  Vitals: There were no vitals taken for this visit.  SKIN: Total skin excluding the undergarment areas was performed. The exam included the head/face, neck, both arms, chest, back, abdomen, both legs, digits and/or nails.   - Mid back: 7 mm scaly papule  - Left upper arm: 8 mm shiny papule   - Well healed scar on previous sites of NMSC.   - There are dome shaped bright red papules on the trunk and extremities.  - Multiple regular brown pigmented macules and papules are identified on the trunk and extremities.   - Scattered brown macules on sun exposed areas.  - There are waxy stuck on tan " to brown papules on the trunk and extremities..   - There are erythematous macules with overyling adherent scale on the right cheek, mid forehead x3, left cheek .   - No other lesions of concern on areas examined.     Medications:  Current Outpatient Medications   Medication     aspirin (ASA) 81 MG EC tablet     IBUPROFEN PO     indomethacin (INDOCIN) 50 MG capsule     Omeprazole (PRILOSEC PO)     No current facility-administered medications for this visit.      Past Medical History:   Patient Active Problem List   Diagnosis     CARDIOVASCULAR SCREENING; LDL GOAL LESS THAN 160     High triglycerides     FH: melanoma     Past Medical History:   Diagnosis Date     CARDIOVASCULAR SCREENING; LDL GOAL LESS THAN 160

## 2022-08-10 NOTE — NURSING NOTE
The following medication was given:     MEDICATION:  Lidocaine with epinephrine 1% 1:838142  ROUTE: SQ  SITE: see procedure note  DOSE: 1 mL  LOT #: [id number]  : [employer]  EXPIRATION DATE: 01/01/2023  NDC#: 1676-3221-28  Was there drug waste? 1 mL  Multi-dose vial: Yes    Jen Warren CMA  August 10, 2022

## 2022-08-10 NOTE — NURSING NOTE
"Dimitris Munoz's chief complaint for this visit includes:  Chief Complaint   Patient presents with     Skin Check     Fbse, middle of back that wife noticed but not too worried \"but it's different\". Rough spots around hairline but that's normal for these visits     PCP: Byron Modi    Referring Provider:  Referred Self, MD  No address on file    There were no vitals taken for this visit.  No Pain (0)      No Known Allergies      Do you need any medication refills at today's visit? No    John Velasco  In Clinic Visit Facilitator    "

## 2022-08-12 LAB
PATH REPORT.COMMENTS IMP SPEC: NORMAL
PATH REPORT.COMMENTS IMP SPEC: NORMAL
PATH REPORT.FINAL DX SPEC: NORMAL
PATH REPORT.GROSS SPEC: NORMAL
PATH REPORT.MICROSCOPIC SPEC OTHER STN: NORMAL
PATH REPORT.RELEVANT HX SPEC: NORMAL

## 2022-09-13 ASSESSMENT — ENCOUNTER SYMPTOMS
COUGH: 0
FEVER: 0
HEARTBURN: 0
HEMATURIA: 0
DIARRHEA: 0
HEMATOCHEZIA: 0
NERVOUS/ANXIOUS: 0
PARESTHESIAS: 0
EYE PAIN: 0
DIZZINESS: 0
WEAKNESS: 0
HEADACHES: 0
FREQUENCY: 1
SORE THROAT: 0
NAUSEA: 0
CONSTIPATION: 0
MYALGIAS: 0
DYSURIA: 0
PALPITATIONS: 0
SHORTNESS OF BREATH: 0
CHILLS: 0
ARTHRALGIAS: 1
ABDOMINAL PAIN: 0
JOINT SWELLING: 0

## 2022-09-15 ASSESSMENT — ENCOUNTER SYMPTOMS
FREQUENCY: 1
PARESTHESIAS: 0
CONSTIPATION: 0
NAUSEA: 0
CHILLS: 0
HEMATURIA: 0
EYE PAIN: 0
NERVOUS/ANXIOUS: 0
SHORTNESS OF BREATH: 0
ABDOMINAL PAIN: 0
DYSURIA: 0
FEVER: 0
HEADACHES: 0
MYALGIAS: 0
HEMATOCHEZIA: 0
DIARRHEA: 0
ARTHRALGIAS: 1
SORE THROAT: 0
COUGH: 0
JOINT SWELLING: 0
DIZZINESS: 0
PALPITATIONS: 0
WEAKNESS: 0
HEARTBURN: 0

## 2022-09-15 NOTE — PROGRESS NOTES
SUBJECTIVE:   CC: Roland is an 57 year old who presents for preventative health visit.       Patient has been advised of split billing requirements and indicates understanding: Yes  Healthy Habits:     Getting at least 3 servings of Calcium per day:  NO    Bi-annual eye exam:  Yes    Dental care twice a year:  Yes    Sleep apnea or symptoms of sleep apnea:  Daytime drowsiness    Diet:  Regular (no restrictions)    Frequency of exercise:  2-3 days/week    Duration of exercise:  Less than 15 minutes    Medication side effects:  Not applicable    PHQ-2 Total Score: 0    Additional concerns today:  No    Over a year history of erectile dysfunction.  He has used Cialis daily in the past but did not like how it made him feel.  He has noticed increased symptoms over the last year would like to try different medication.      Gout history and his great toe at times.  He will use indomethacin for about 2 days about 4 times a year.    Urinary frequency for last couple months gradually.  He states he gets up once or twice a night but finds that his symptoms worsen when he drinks alcohol or more fluids in the evening time.  Otherwise daytime symptoms are much better.  He does have some slight symptoms of not fully emptying his bladder at times.  No dysuria.    Today's PHQ-2 Score:   PHQ-2 ( 1999 Pf[employer]) 9/13/2022   Q1: Little interest or pleasure in doing things 0   Q2: Feeling down, depressed or hopeless 0   PHQ-2 Score 0   PHQ-2 Total Score (12-17 Years)- Positive if 3 or more points; Administer PHQ-A if positive -   Q1: Little interest or pleasure in doing things Not at all   Q2: Feeling down, depressed or hopeless Not at all   PHQ-2 Score 0     Abuse: Current or Past(Physical, Sexual or Emotional)- No  Do you feel safe in your environment? Yes      Social History     Tobacco Use     Smoking status: Never Smoker     Smokeless tobacco: Never Used   Substance Use Topics     Alcohol use: Yes     Comment: 10 beers per week        Alcohol Use 9/13/2022   Prescreen: >3 drinks/day or >7 drinks/week? No   Prescreen: >3 drinks/day or >7 drinks/week? -       Last PSA:   PSA   Date Value Ref Range Status   06/23/2009 0.78 0 - 4 ug/L Final     Prostate Specific Antigen Screen   Date Value Ref Range Status   11/30/2021 1.13 0.00 - 4.00 ug/L Final       Reviewed orders with patient. Reviewed health maintenance and updated orders accordingly - Yes  Lab work is in process  Labs reviewed in EPIC  BP Readings from Last 3 Encounters:   09/16/22 (!) 143/84   11/30/21 132/75   12/04/20 128/68    Wt Readings from Last 3 Encounters:   09/16/22 108 kg (238 lb)   11/30/21 109.3 kg (241 lb)   12/04/20 107.5 kg (237 lb)                  Patient Active Problem List   Diagnosis     CARDIOVASCULAR SCREENING; LDL GOAL LESS THAN 160     High triglycerides     FH: melanoma     Erectile dysfunction, unspecified erectile dysfunction type     Acute gouty arthritis     Past Surgical History:   Procedure Laterality Date     COLONOSCOPY WITH CO2 INSUFFLATION N/A 4/2/2015    Procedure: COLONOSCOPY WITH CO2 INSUFFLATION;  Surgeon: Alfredo Kowalski MD;  Location: [facility]     HERNIA REPAIR         Social History     Tobacco Use     Smoking status: Never Smoker     Smokeless tobacco: Never Used   Substance Use Topics     Alcohol use: Yes     Comment: 10 beers per week     Family History   Problem Relation Age of Onset     Cancer Father 53        skin cancer         Current Outpatient Medications   Medication Sig Dispense Refill     aspirin (ASA) 81 MG EC tablet Take 1 tablet (81 mg) by mouth daily       IBUPROFEN PO Take 200 mg by mouth 3 times daily 400 to 600 mg       indomethacin (INDOCIN) 50 MG capsule Take 1 capsule (50 mg) by mouth 3 times daily as needed for moderate pain 60 capsule 1     Omeprazole (PRILOSEC PO)        sildenafil (VIAGRA) 100 MG tablet Take 1 tablet (100 mg) by mouth daily as needed (ED) 30 tablet 3     tamsulosin (FLOMAX) 0.4 MG capsule Take 1  capsule (0.4 mg) by mouth daily 30 capsule 1     No Known Allergies    Reviewed and updated as needed this visit by clinical staff   Tobacco  Allergies  Meds  Problems  Med Hx  Surg Hx  Fam Hx  Soc   Hx          Reviewed and updated as needed this visit by Provider   Tobacco  Allergies  Meds  Problems  Med Hx  Surg Hx  Fam Hx             Past Medical History:   Diagnosis Date     CARDIOVASCULAR SCREENING; LDL GOAL LESS THAN 160       Past Surgical History:   Procedure Laterality Date     COLONOSCOPY WITH CO2 INSUFFLATION N/A 4/2/2015    Procedure: COLONOSCOPY WITH CO2 INSUFFLATION;  Surgeon: Alfredo Kowalski MD;  Location: [facility]     HERNIA REPAIR         Review of Systems   Constitutional: Negative for chills and fever.   HENT: Positive for hearing loss. Negative for congestion, ear pain and sore throat.    Eyes: Negative for pain and visual disturbance.   Respiratory: Negative for cough and shortness of breath.    Cardiovascular: Negative for chest pain, palpitations and peripheral edema.   Gastrointestinal: Negative for abdominal pain, constipation, diarrhea, heartburn, hematochezia and nausea.   Genitourinary: Positive for frequency and impotence. Negative for dysuria, genital sores, hematuria, penile discharge and urgency.   Musculoskeletal: Positive for arthralgias. Negative for joint swelling and myalgias.   Skin: Negative for rash.   Neurological: Negative for dizziness, weakness, headaches and paresthesias.   Psychiatric/Behavioral: Negative for mood changes. The patient is not nervous/anxious.          OBJECTIVE:   BP (!) 143/84   Pulse 60   Temp 97.5  F (36.4  C) (Tympanic)   Resp 14   Ht 1.829 m (6')   Wt 108 kg (238 lb)   SpO2 99%   BMI 32.28 kg/m      Physical Exam  GENERAL: healthy, alert and no distress  EYES: Eyes grossly normal to inspection, PERRL and conjunctivae and sclerae normal  HENT: ear canals and TM's normal, nose and mouth without ulcers or lesions  NECK: no  adenopathy, no asymmetry, masses, or scars and thyroid normal to palpation  RESP: lungs clear to auscultation - no rales, rhonchi or wheezes  CV: regular rate and rhythm, normal S1 S2, no S3 or S4, no murmur, click or rub, no peripheral edema and peripheral pulses strong  ABDOMEN: soft, nontender, no hepatosplenomegaly, no masses and bowel sounds normal   (male): normal male genitalia without lesions or urethral discharge, no hernia  MS: no gross musculoskeletal defects noted, no edema  SKIN: no suspicious lesions or rashes  NEURO: Normal strength and tone, mentation intact and speech normal  PSYCH: mentation appears normal, affect normal/bright    Diagnostic Test Results:  Labs reviewed in Epic    ASSESSMENT/PLAN:   ,    ICD-10-CM    1. Encounter for preventive care  Z00.00 Lipid panel reflex to direct LDL Fasting     Comprehensive metabolic panel (BMP + Alb, Alk Phos, ALT, AST, Total. Bili, TP)     PSA, screen     Lipid panel reflex to direct LDL Fasting     Comprehensive metabolic panel (BMP + Alb, Alk Phos, ALT, AST, Total. Bili, TP)     PSA, screen   2. Erectile dysfunction, unspecified erectile dysfunction type  N52.9 sildenafil (VIAGRA) 100 MG tablet     OFFICE/OUTPT VISIT,EST,LEVL III   3. Urinary frequency  R35.0 tamsulosin (FLOMAX) 0.4 MG capsule     OFFICE/OUTPT VISIT,EST,LEVL III     UA Macro with Reflex to Micro and Culture - lab collect     UA Macro with Reflex to Micro and Culture - lab collect   4. Acute gouty arthritis  M10.9 Uric acid     indomethacin (INDOCIN) 50 MG capsule     OFFICE/OUTPT VISIT,EST,LEVL III     Uric acid   5. Great toe pain, right  M79.674 indomethacin (INDOCIN) 50 MG capsule     OFFICE/OUTPT VISIT,EST,LEVL III   6. Elevated BP without diagnosis of hypertension  R03.0    1.  Diet and exercise were discussed.  Labs are pending  2.  Trial of sildenafil warning signs side effects were discussed.  3.  Flomax was prescribed warning signs side effects were discussed if he continues  to have symptoms follow-up with urology.  3-4.  Okay for indomethacin.  Labs are pending.  Otherwise follow-up yearly  6.  He can work on diet and exercise recheck his blood pressure with her ancillary service in a couple months.    COUNSELING:   Reviewed preventive health counseling, as reflected in patient instructions       Regular exercise       Healthy diet/nutrition       Vision screening       Prostate cancer screening    Estimated body mass index is 32.28 kg/m  as calculated from the following:    Height as of this encounter: 1.829 m (6').    Weight as of this encounter: 108 kg (238 lb).     Weight management plan: Discussed healthy diet and exercise guidelines    He reports that he has never smoked. He has never used smokeless tobacco.      Counseling Resources:  ATP IV Guidelines  Pooled Cohorts Equation Calculator  FRAX Risk Assessment  ICSI Preventive Guidelines  Dietary Guidelines for Americans, 2010  USDA's MyPlate  ASA Prophylaxis  Lung CA Screening    TANNER Tejada [facility]

## 2022-09-16 ENCOUNTER — OFFICE VISIT (OUTPATIENT)
Dept: FAMILY MEDICINE | Facility: CLINIC | Age: 57
End: 2022-09-16
Payer: COMMERCIAL

## 2022-09-16 VITALS
SYSTOLIC BLOOD PRESSURE: 143 MMHG | RESPIRATION RATE: 14 BRPM | TEMPERATURE: 97.5 F | HEIGHT: 72 IN | WEIGHT: 238 LBS | DIASTOLIC BLOOD PRESSURE: 84 MMHG | OXYGEN SATURATION: 99 % | BODY MASS INDEX: 32.23 KG/M2 | HEART RATE: 60 BPM

## 2022-09-16 DIAGNOSIS — R35.0 URINARY FREQUENCY: ICD-10-CM

## 2022-09-16 DIAGNOSIS — N52.9 ERECTILE DYSFUNCTION, UNSPECIFIED ERECTILE DYSFUNCTION TYPE: ICD-10-CM

## 2022-09-16 DIAGNOSIS — M79.674 GREAT TOE PAIN, RIGHT: ICD-10-CM

## 2022-09-16 DIAGNOSIS — Z00.00 ENCOUNTER FOR PREVENTIVE CARE: Primary | ICD-10-CM

## 2022-09-16 DIAGNOSIS — M10.9 ACUTE GOUTY ARTHRITIS: ICD-10-CM

## 2022-09-16 DIAGNOSIS — R03.0 ELEVATED BP WITHOUT DIAGNOSIS OF HYPERTENSION: ICD-10-CM

## 2022-09-16 LAB
ALBUMIN SERPL-MCNC: 3.8 G/DL (ref 3.4–5)
ALBUMIN UR-MCNC: NEGATIVE MG/DL
ALP SERPL-CCNC: 78 U/L (ref 40–150)
ALT SERPL W P-5'-P-CCNC: 37 U/L (ref 0–70)
ANION GAP SERPL CALCULATED.3IONS-SCNC: 6 MMOL/L (ref 3–14)
APPEARANCE UR: CLEAR
AST SERPL W P-5'-P-CCNC: 21 U/L (ref 0–45)
BILIRUB SERPL-MCNC: 0.7 MG/DL (ref 0.2–1.3)
BILIRUB UR QL STRIP: NEGATIVE
BUN SERPL-MCNC: 16 MG/DL (ref 7–30)
CALCIUM SERPL-MCNC: 8.9 MG/DL (ref 8.5–10.1)
CHLORIDE BLD-SCNC: 108 MMOL/L (ref 94–109)
CHOLEST SERPL-MCNC: 143 MG/DL
CO2 SERPL-SCNC: 27 MMOL/L (ref 20–32)
COLOR UR AUTO: YELLOW
CREAT SERPL-MCNC: 1.4 MG/DL (ref 0.66–1.25)
FASTING STATUS PATIENT QL REPORTED: YES
GFR SERPL CREATININE-BSD FRML MDRD: 58 ML/MIN/1.73M2
GLUCOSE BLD-MCNC: 107 MG/DL (ref 70–99)
GLUCOSE UR STRIP-MCNC: NEGATIVE MG/DL
HDLC SERPL-MCNC: 27 MG/DL
HGB UR QL STRIP: NEGATIVE
KETONES UR STRIP-MCNC: NEGATIVE MG/DL
LDLC SERPL CALC-MCNC: 63 MG/DL
LEUKOCYTE ESTERASE UR QL STRIP: NEGATIVE
NITRATE UR QL: NEGATIVE
NONHDLC SERPL-MCNC: 116 MG/DL
PH UR STRIP: 5.5 [PH] (ref 5–7)
POTASSIUM BLD-SCNC: 4 MMOL/L (ref 3.4–5.3)
PROT SERPL-MCNC: 7.1 G/DL (ref 6.8–8.8)
PSA SERPL-MCNC: 1.22 UG/L (ref 0–4)
SODIUM SERPL-SCNC: 141 MMOL/L (ref 133–144)
SP GR UR STRIP: 1.02 (ref 1–1.03)
TRIGL SERPL-MCNC: 264 MG/DL
URATE SERPL-MCNC: 8.2 MG/DL (ref 3.5–7.2)
UROBILINOGEN UR STRIP-ACNC: 0.2 E.U./DL

## 2022-09-16 PROCEDURE — G0103 PSA SCREENING: HCPCS | Performed by: PHYSICIAN ASSISTANT

## 2022-09-16 PROCEDURE — 80061 LIPID PANEL: CPT | Performed by: PHYSICIAN ASSISTANT

## 2022-09-16 PROCEDURE — 84550 ASSAY OF BLOOD/URIC ACID: CPT | Performed by: PHYSICIAN ASSISTANT

## 2022-09-16 PROCEDURE — 81003 URINALYSIS AUTO W/O SCOPE: CPT | Performed by: PHYSICIAN ASSISTANT

## 2022-09-16 PROCEDURE — 80053 COMPREHEN METABOLIC PANEL: CPT | Performed by: PHYSICIAN ASSISTANT

## 2022-09-16 PROCEDURE — 36415 COLL VENOUS BLD VENIPUNCTURE: CPT | Performed by: PHYSICIAN ASSISTANT

## 2022-09-16 PROCEDURE — 99214 OFFICE O/P EST MOD 30 MIN: CPT | Mod: 25 | Performed by: PHYSICIAN ASSISTANT

## 2022-09-16 PROCEDURE — 99396 PREV VISIT EST AGE 40-64: CPT | Performed by: PHYSICIAN ASSISTANT

## 2022-09-16 RX ORDER — TAMSULOSIN HYDROCHLORIDE 0.4 MG/1
0.4 CAPSULE ORAL DAILY
Qty: 30 CAPSULE | Refills: 1 | Status: SHIPPED | OUTPATIENT
Start: 2022-09-16 | End: 2022-11-15

## 2022-09-16 RX ORDER — SILDENAFIL 100 MG/1
100 TABLET, FILM COATED ORAL DAILY PRN
Qty: 30 TABLET | Refills: 3 | Status: SHIPPED | OUTPATIENT
Start: 2022-09-16

## 2022-09-16 RX ORDER — INDOMETHACIN 50 MG/1
50 CAPSULE ORAL 3 TIMES DAILY PRN
Qty: 60 CAPSULE | Refills: 1 | Status: SHIPPED | OUTPATIENT
Start: 2022-09-16 | End: 2022-12-16

## 2022-09-16 ASSESSMENT — PAIN SCALES - GENERAL: PAINLEVEL: MODERATE PAIN (4)

## 2022-09-28 ENCOUNTER — OFFICE VISIT (OUTPATIENT)
Dept: DERMATOLOGY | Facility: CLINIC | Age: 57
End: 2022-09-28
Payer: COMMERCIAL

## 2022-09-28 DIAGNOSIS — L57.0 HYPERTROPHIC ACTINIC KERATOSIS: Primary | ICD-10-CM

## 2022-09-28 PROCEDURE — 17000 DESTRUCT PREMALG LESION: CPT | Performed by: PHYSICIAN ASSISTANT

## 2022-09-28 ASSESSMENT — PAIN SCALES - GENERAL: PAINLEVEL: NO PAIN (0)

## 2022-09-28 NOTE — NURSING NOTE
Dimitris Munoz's chief complaint for this visit includes:  Chief Complaint   Patient presents with     Actinic Keratosis     Cryo HAK on midback     PCP: Byrno Modi    Referring Provider:  No referring provider defined for this encounter.    There were no vitals taken for this visit.  No Pain (0)      No Known Allergies      Do you need any medication refills at today's visit? No    Jen Warren CMA

## 2022-09-28 NOTE — PROGRESS NOTES
[facility] Dermatology Note  Encounter Date: Sep 28, 2022  Office Visit     Dermatology Problem List:  Last skin check 8/10/22, recommended yearly.  1. Family history of melanoma (father, ; paternal aunt)  2. BCC, right lateral neck, s/p excision 2018  3. Actinic Keratosis, s/p cryotherapy   - HAK, mid back s/p bx  8/10/22 s/p cryo   - right cheek has been tx with cryo 2x, if returns for a 3rd time low threshold to bx  4. History of benign biopsy  - BLK, left upper arm s/p bx 8/10/22     Social History: Works as an  for [employer], rides bike to work. Has a cabin he spends time at. Wears sunscreen fairly diligently.  Family History: Father () and paternal aunt had melanoma.  ____________________________________________    Assessment & Plan:    # Actinic Keratosis - right cheek s/p cryo x2 - resolved.   -will recheck at 1 year skin exam - consider bx if returns    # biopsy confirmed HAK with overlying cutaneous horn, mid back s/p bx  8/10/22  - See cryo note.     Procedures Performed:   - Cryotherapy procedure note. After verbal consent and discussion of risks and benefits including, but not limited to, dyspigmentation/scar, blister, and pain, 1 lesion(s) was(were) treated with 1-2 mm freeze border for 1-2 cycles with liquid nitrogen. Post cryotherapy instructions were provided.      Follow-up: 2023 skin check.     Staff and Scribe:     Scribe Disclosure:   Alexi LAZCANO, am serving as a scribe to document services personally performed by Brittney Mason PA-C, based on data collection and the provider's statements to me.  Provider Disclosure:   The documentation recorded by the scribe accurately reflects the services I personally performed and the decisions made by me.    All risks, benefits and alternatives were discussed with patient.  Patient is in agreement and understands the assessment and plan.  All questions were answered.    Brittney Mason PA-C,  MPAS  [facility]: Phone: [phone], Fax: [phone]  [facility]: Phone: [phone],  Fax: [phone]  [facility]: Phone: [phone], Fax: [phone]  ____________________________________    CC: Actinic Keratosis (Cryo HAK on midback)    HPI:  Mr. Dimitris Munoz is a(n) 57 year old male who presents today as a return patient for a spot check.     Last seen on 8/10/22 for a skin check. At that time, a bx was performed on the left upper arm and mid back. AKs were treated with cryo.     Today, patient presents for cryo on the mid back.     Patient is otherwise feeling well, without additional concerns.    Labs:  NA    Physical Exam:  Vitals: There were no vitals taken for this visit.  SKIN: Focused examination of midback was performed.  - well healed scar on the mid back, some hyperkeratosis on a lateral edge  - no lesions of concern on the R cheek  - No other lesions of concern on areas examined.     Medications:  Current Outpatient Medications   Medication     aspirin (ASA) 81 MG EC tablet     IBUPROFEN PO     indomethacin (INDOCIN) 50 MG capsule     Omeprazole (PRILOSEC PO)     sildenafil (VIAGRA) 100 MG tablet     tamsulosin (FLOMAX) 0.4 MG capsule     No current facility-administered medications for this visit.      Past Medical History:   Patient Active Problem List   Diagnosis     CARDIOVASCULAR SCREENING; LDL GOAL LESS THAN 160     High triglycerides     FH: melanoma     Erectile dysfunction, unspecified erectile dysfunction type     Acute gouty arthritis     Past Medical History:   Diagnosis Date     CARDIOVASCULAR SCREENING; LDL GOAL LESS THAN 160

## 2022-09-28 NOTE — LETTER
2022         RE: Dimitris Munoz  [address] [address] [place] [address]        Dear Colleague,    Thank you for referring your patient, Dimitris Munoz, to the [facility]. Please see a copy of my visit note below.    [facility] Dermatology Note  Encounter Date: Sep 28, 2022  Office Visit     Dermatology Problem List:  Last skin check 8/10/22, recommended yearly.  1. Family history of melanoma (father, ; paternal aunt)  2. BCC, right lateral neck, s/p excision 2018  3. Actinic Keratosis, s/p cryotherapy   - HAK, mid back s/p bx  8/10/22 s/p cryo   - right cheek has been tx with cryo 2x, if returns for a 3rd time low threshold to bx  4. History of benign biopsy  - BLK, left upper arm s/p bx 8/10/22     Social History: Works as an  for [employer], rides bike to work. Has a cabin he spends time at. Wears sunscreen fairly diligently.  Family History: Father () and paternal aunt had melanoma.  ____________________________________________    Assessment & Plan:    # Actinic Keratosis - right cheek s/p cryo x2 - resolved.   -will recheck at 1 year skin exam - consider bx if returns    # biopsy confirmed HAK with overlying cutaneous horn, mid back s/p bx  8/10/22  - See cryo note.     Procedures Performed:   - Cryotherapy procedure note. After verbal consent and discussion of risks and benefits including, but not limited to, dyspigmentation/scar, blister, and pain, 1 lesion(s) was(were) treated with 1-2 mm freeze border for 1-2 cycles with liquid nitrogen. Post cryotherapy instructions were provided.      Follow-up: 2023 skin check.     Staff and Scribe:     Scribe Disclosure:   Alexi LAZCANO, am serving as a scribe to document services personally performed by Brittney Mason PA-C, based on data collection and the provider's statements to me.  Provider Disclosure:   The documentation recorded by the scribe accurately reflects the  services I personally performed and the decisions made by me.    All risks, benefits and alternatives were discussed with patient.  Patient is in agreement and understands the assessment and plan.  All questions were answered.    Brittney Mason PA-C, MPAS  [facility]: Phone: [phone], Fax: [phone]  [facility]: Phone: [phone],  Fax: [phone]  [facility]: Phone: [phone], Fax: [phone]  ____________________________________    CC: Actinic Keratosis (Cryo HAK on midback)    HPI:  Mr. Dimitris Munoz is a(n) 57 year old male who presents today as a return patient for a spot check.     Last seen on 8/10/22 for a skin check. At that time, a bx was performed on the left upper arm and mid back. AKs were treated with cryo.     Today, patient presents for cryo on the mid back.     Patient is otherwise feeling well, without additional concerns.    Labs:  NA    Physical Exam:  Vitals: There were no vitals taken for this visit.  SKIN: Focused examination of midback was performed.  - well healed scar on the mid back, some hyperkeratosis on a lateral edge  - no lesions of concern on the R cheek  - No other lesions of concern on areas examined.     Medications:  Current Outpatient Medications   Medication     aspirin (ASA) 81 MG EC tablet     IBUPROFEN PO     indomethacin (INDOCIN) 50 MG capsule     Omeprazole (PRILOSEC PO)     sildenafil (VIAGRA) 100 MG tablet     tamsulosin (FLOMAX) 0.4 MG capsule     No current facility-administered medications for this visit.      Past Medical History:   Patient Active Problem List   Diagnosis     CARDIOVASCULAR SCREENING; LDL GOAL LESS THAN 160     High triglycerides     FH: melanoma     Erectile dysfunction, unspecified erectile dysfunction type     Acute gouty arthritis     Past Medical History:   Diagnosis Date     CARDIOVASCULAR SCREENING; LDL GOAL LESS  THAN 160                 Again, thank you for allowing me to participate in the care of your patient.        Sincerely,        Brittney Mason PA-C

## 2022-09-28 NOTE — PATIENT INSTRUCTIONS
Cryotherapy    What is it?  Use of a very cold liquid, such as liquid nitrogen, to freeze and destroy abnormal skin cells that need to be removed    What should I expect?  Tenderness and redness  A small blister that might grow and fill with dark purple blood. There may be crusting.  More than one treatment may be needed if the lesions do not go away.    How do I care for the treated area?  Gently wash the area with your hands when bathing.  Use a thin layer of Vaseline to help with healing. You may use a Band-Aid.   The area should heal within 7-10 days and may leave behind a pink or lighter color.   Do not use an antibiotic or Neosporin ointment.   You may take acetaminophen (Tylenol) for pain.     Call your doctor if you have:  Severe pain  Signs of infection (warmth, redness, cloudy yellow drainage, and or a bad smell)  Questions or concerns    Who should I call with questions?      [facility]: [phone]      [facility]: [phone]      For urgent needs outside of business hours call the [facility] at [phone] and ask for the dermatology resident on call

## 2022-11-13 DIAGNOSIS — R35.0 URINARY FREQUENCY: ICD-10-CM

## 2022-11-15 ENCOUNTER — TELEPHONE (OUTPATIENT)
Dept: FAMILY MEDICINE | Facility: CLINIC | Age: 57
End: 2022-11-15

## 2022-11-15 DIAGNOSIS — R35.0 URINARY FREQUENCY: ICD-10-CM

## 2022-11-15 RX ORDER — TAMSULOSIN HYDROCHLORIDE 0.4 MG/1
CAPSULE ORAL
Qty: 30 CAPSULE | Refills: 1 | Status: SHIPPED | OUTPATIENT
Start: 2022-11-15 | End: 2022-11-15

## 2022-11-15 RX ORDER — TAMSULOSIN HYDROCHLORIDE 0.4 MG/1
0.4 CAPSULE ORAL DAILY
Qty: 90 CAPSULE | Refills: 1 | Status: SHIPPED | OUTPATIENT
Start: 2022-11-15 | End: 2023-09-12

## 2022-12-15 NOTE — PROGRESS NOTES
Assessment & Plan     Follow-up exam    - sulfamethoxazole-trimethoprim (BACTRIM DS) 800-160 MG tablet; Take 1 tablet by mouth 2 times daily With daily yogurt or probiotics  - indomethacin (INDOCIN) 50 MG capsule; Take 1 capsule (50 mg) by mouth 3 times daily (with meals) Stop when pain resolved    Perioral dermatitis    - nystatin-triamcinolone (MYCOLOG) 694305-2.1 UNIT/GM-% external ointment; Apply topically 2 times daily for 14 days    Cellulitis and abscess of foot excluding toe    - sulfamethoxazole-trimethoprim (BACTRIM DS) 800-160 MG tablet; Take 1 tablet by mouth 2 times daily With daily yogurt or probiotics  - indomethacin (INDOCIN) 50 MG capsule; Take 1 capsule (50 mg) by mouth 3 times daily (with meals) Stop when pain resolved    Acute gouty arthritis    - indomethacin (INDOCIN) 50 MG capsule; Take 1 capsule (50 mg) by mouth 3 times daily (with meals) Stop when pain resolved    30 minutes spent on the date of the encounter doing chart review, history and exam, documentation and further activities per the note   MED REC REQUIRED  Post Medication Reconciliation Status: discharge medications reconciled and changed, per note/orders  See Patient Instructions: Take full course of antibiotics with daily yogurt or probiotics twice daily.  Monitor redness if spreading needing to go to ER for IV antibiotics.  Use topical ointment twice daily for up to 14 days until rash resolves.  Take indomethacin with food.  Follow-up as needed patient in agreement.    Return in about 1 week (around 12/23/2022), or if symptoms worsen or fail to improve.    OSMAR SMITH  [facility] TORREY Watkins is a 57 year old, presenting for the following health issues:  RECHECK      HPI     ED/UC Followup:    Facility:  [facility]  Date of visit: 12/10/2022  Reason for visit: Leg pain  Current Status: pain is progressing on the inside of right ankle and pain has  "settled into your achilles tendon and under the heal, cellulitis-was started on doxycycline to cover for MRSA due to abscess with purulent drainage.  He does have a history of gout and reports worsening ankle/heel pain.  He did miss 1 course of antibiotics last night.  Redness and warmth seems to be worsening.  He has been wearing his compression socks which has reduced the swelling significantly.  Pain is rated 7 out of 10.  Area of redness outlined.  Discussed starting a new antibiotic and not missing any antibiotic doses.  Additionally taking full course of antibiotics with daily yogurt or probiotics.  If redness is worsening he needs to go to the ER for probable IV antibiotics.  Discussed topical ointment for facial rash    Patient has a rash around lips, began about 6 weeks ago. Dry, peeling.     Review of Systems   Constitutional, HEENT, cardiovascular, pulmonary, GI, , musculoskeletal, neuro, skin, endocrine and psych systems are negative, except as otherwise noted.      Objective    /78   Pulse 74   Temp 97  F (36.1  C) (Tympanic)   Resp 12   Ht 1.836 m (6' 0.28\")   Wt 109 kg (240 lb 6.4 oz)   SpO2 97%   BMI 32.35 kg/m    Body mass index is 32.35 kg/m .  Physical Exam   GENERAL: Healthy, alert and no distress  EYES: Eyes grossly normal to inspection.  No discharge or erythema, or obvious scleral/conjunctival abnormalities.  RESP: No audible wheeze, cough, or visible cyanosis.  No visible retractions or increased work of breathing.    SKIN: POSITIVE for redness and warmth to RLL- outlined with skin marker, tender to touch.  Per patient they did ultrasound to check for a blood clot at hospital.  Dry peeling perioral rash  NEURO: Cranial nerves grossly intact.  Mentation and speech appropriate for age.  PSYCH: Mentation appears normal, affect normal/bright, judgement and insight intact, normal speech and appearance well-groomed.    See orders              "

## 2022-12-16 ENCOUNTER — OFFICE VISIT (OUTPATIENT)
Dept: FAMILY MEDICINE | Facility: CLINIC | Age: 57
End: 2022-12-16
Payer: COMMERCIAL

## 2022-12-16 VITALS
SYSTOLIC BLOOD PRESSURE: 130 MMHG | DIASTOLIC BLOOD PRESSURE: 78 MMHG | BODY MASS INDEX: 32.56 KG/M2 | OXYGEN SATURATION: 97 % | HEIGHT: 72 IN | RESPIRATION RATE: 12 BRPM | WEIGHT: 240.4 LBS | TEMPERATURE: 97 F | HEART RATE: 74 BPM

## 2022-12-16 DIAGNOSIS — L03.119 CELLULITIS AND ABSCESS OF FOOT EXCLUDING TOE: ICD-10-CM

## 2022-12-16 DIAGNOSIS — L02.619 CELLULITIS AND ABSCESS OF FOOT EXCLUDING TOE: ICD-10-CM

## 2022-12-16 DIAGNOSIS — Z09 FOLLOW-UP EXAM: Primary | ICD-10-CM

## 2022-12-16 DIAGNOSIS — L71.0 PERIORAL DERMATITIS: ICD-10-CM

## 2022-12-16 DIAGNOSIS — M10.9 ACUTE GOUTY ARTHRITIS: ICD-10-CM

## 2022-12-16 PROCEDURE — 99214 OFFICE O/P EST MOD 30 MIN: CPT | Performed by: NURSE PRACTITIONER

## 2022-12-16 RX ORDER — INDOMETHACIN 50 MG/1
50 CAPSULE ORAL
Qty: 30 CAPSULE | Refills: 0 | Status: SHIPPED | OUTPATIENT
Start: 2022-12-16

## 2022-12-16 RX ORDER — SULFAMETHOXAZOLE/TRIMETHOPRIM 800-160 MG
1 TABLET ORAL 2 TIMES DAILY
Qty: 14 TABLET | Refills: 0 | Status: SHIPPED | OUTPATIENT
Start: 2022-12-16 | End: 2024-02-22

## 2022-12-16 RX ORDER — NYSTATIN AND TRIAMCINOLONE ACETONIDE 100000; 1 [USP'U]/G; MG/G
OINTMENT TOPICAL 2 TIMES DAILY
Qty: 30 G | Refills: 0 | Status: SHIPPED | OUTPATIENT
Start: 2022-12-16 | End: 2022-12-30

## 2022-12-16 RX ORDER — DOXYCYCLINE 100 MG/1
100 TABLET ORAL
COMMUNITY
Start: 2022-12-10 | End: 2024-02-22

## 2022-12-16 ASSESSMENT — PAIN SCALES - GENERAL: PAINLEVEL: EXTREME PAIN (8)

## 2022-12-23 ENCOUNTER — MYC MEDICAL ADVICE (OUTPATIENT)
Dept: FAMILY MEDICINE | Facility: CLINIC | Age: 57
End: 2022-12-23

## 2023-01-08 ENCOUNTER — HEALTH MAINTENANCE LETTER (OUTPATIENT)
Age: 58
End: 2023-01-08

## 2023-08-10 NOTE — PROGRESS NOTES
[facility] Dermatology Note  Encounter Date: Aug 16, 2023  Office Visit     Dermatology Problem List:  Last skin check 23, recommended yearly.  1. Family history of melanoma (father, ; paternal aunt)  2. BCC, right lateral neck, s/p excision 2018  3. Actinic Keratosis, s/p cryotherapy   - HAK, mid back s/p bx  8/10/22 s/p cryo   - right cheek has been tx with cryo 2x - efudex initiated 23  4. History of benign biopsy  - BLK, left upper arm s/p bx 8/10/22     Social History: Works as an  for [employer], rides bike to work. Has a cabin he spends time at. Wears sunscreen fairly diligently.  Family History: Father () and paternal aunt had melanoma.  ____________________________________________    Assessment & Plan:    # Family history of melanoma -father, paternal aunt  - ABCDEs: Counseled ABCDEs of melanoma: Asymmetry, Border (irregularity), Color (not uniform, changes in color), Diameter (greater than 6 mm which is about the size of a pencil eraser), and Evolving (any changes in preexisting moles).  - Sun protection: Counseled SPF30+ sunscreen, UPF clothing, sun avoidance, tanning bed avoidance.    - Continue annual skin exams    # Multiple clinically benign nevi on the trunk and extremities. No treatment is necessary at this time unless the lesion changes or becomes symptomatic.    - ABCDEs of melanoma were discussed and self skin checks were advised.    # Seborrheic keratosis, non irritated on the trunk and extremities. Explained to patient benign nature of lesion. No treatment is necessary at this time unless the lesion changes or becomes symptomatic.     - Monitor for changes.    # Cherry Angiomas - trunk and extremities. Explained to patient benign nature of lesion. No treatment is necessary at this time unless the lesion changes or becomes symptomatic.      # Solar lentigines on the sun exposed skin areas. Benign nature was discussed. No further  intervention required at this time.    - Sun precaution was advised including the use of sun screens of SPF 30 or higher, sun protective clothing, and avoidance of tanning beds.      # Hx BCC  - Sunscreen: Apply 20 minutes prior to going outdoors and reapply every two hours, when wet or sweating. We recommend using an SPF 30 or higher, and to use one that is water resistant.     - Advised to monitor for changing, non-healing, bleeding, painful, changing, or otherwise symptomatic lesions  - Continue annual skin exams    # AKs - face, L shoulder x6  - cryo - see below  - lesion on the L cheek has been treated twice with cryo, today there is a very slight grittiness to the area - will elect to trial field therapy with efudex as opposed to bx today  - start efudex 5% cream BID to AA on the cheek - wash hands after use - expect results discussed    Procedures Performed:   - Cryotherapy procedure note: After verbal consent and discussion of risks and benefits including but no limited to dyspigmentation/scar, blister, and pain, 6 was(were) treated with 1-2mm freeze border for 2 cycles with liquid nitrogen. Post cryotherapy instructions were provided.     Follow-up: 1 year(s) in-person, or earlier for new or changing lesions    Staff and Scribe:     Scribe Disclosure:   I, Archana Meredith, am serving as a scribe to document services personally performed by this physician, Brittney Mason PA-C, based on data collection and the provider's statements to me.   Provider Disclosure:   The documentation recorded by the scribe accurately reflects the services I personally performed and the decisions made by me.    All risks, benefits and alternatives were discussed with patient.  Patient is in agreement and understands the assessment and plan.  All questions were answered.    Brittney Mason PA-C, MPAS  [facility]: Phone: [phone], Fax: [phone]  [facility]  [facility]: Phone: [phone],  Fax: [phone]  [facility]: Phone: [phone], Fax: [phone]    ____________________________________________    CC: No chief complaint on file.    HPI:  Mr. Dimitris Munoz is a(n) 57 year old male who presents today as a return patient for a skin check.     Last seen 9/28/22 for a spot check. At that time 1 AK was treated with cryo. Fhx melanoma (father) and personal hx of BCC as well as AKs in the past.    Today, notes R cheek lesion is much better, but he does not a bit of texture at the site.     Patient is otherwise feeling well, without additional skin concerns.    Labs Reviewed:  N/A    Physical Exam:  Vitals: There were no vitals taken for this visit.  SKIN: Total skin excluding the undergarment areas was performed. The exam included the head/face, neck, both arms, chest, back, abdomen, both legs, digits and/or nails.   - Gay type II.  - well healed scar on R lateral neck, NERD  - There are erythematous macules with overlying adherent scale on the L cheek x1, L shoulder x2 and forehead x3.   - There are dome shaped bright red papules on the trunk and extremities.   - Multiple regular brown pigmented macules and papules are identified on the trunk and extremities, >100.  - Scattered brown macules on sun exposed areas.  - There are waxy stuck on tan to brown papules on the trunk and extremities.    - No other lesions of concern on areas examined.     Medications:  Current Outpatient Medications   Medication    aspirin (ASA) 81 MG EC tablet    doxycycline monohydrate (ADOXA) 100 MG tablet    IBUPROFEN PO    indomethacin (INDOCIN) 50 MG capsule    Omeprazole (PRILOSEC PO)    sildenafil (VIAGRA) 100 MG tablet    sulfamethoxazole-trimethoprim (BACTRIM DS) 800-160 MG tablet    tamsulosin (FLOMAX) 0.4 MG capsule     No current facility-administered medications for this visit.      Past Medical History:   Patient Active Problem List    Diagnosis    CARDIOVASCULAR SCREENING; LDL GOAL LESS THAN 160    High triglycerides    FH: melanoma    Erectile dysfunction, unspecified erectile dysfunction type    Acute gouty arthritis    Cellulitis and abscess of foot excluding toe    Perioral dermatitis     Past Medical History:   Diagnosis Date    CARDIOVASCULAR SCREENING; LDL GOAL LESS THAN 160

## 2023-08-16 ENCOUNTER — OFFICE VISIT (OUTPATIENT)
Dept: DERMATOLOGY | Facility: CLINIC | Age: 58
End: 2023-08-16
Payer: COMMERCIAL

## 2023-08-16 DIAGNOSIS — D22.9 MULTIPLE BENIGN NEVI: ICD-10-CM

## 2023-08-16 DIAGNOSIS — L82.1 SEBORRHEIC KERATOSES: ICD-10-CM

## 2023-08-16 DIAGNOSIS — Z85.828 HISTORY OF BASAL CELL CARCINOMA: ICD-10-CM

## 2023-08-16 DIAGNOSIS — Z80.8 FAMILY HISTORY OF MELANOMA: Primary | ICD-10-CM

## 2023-08-16 DIAGNOSIS — D18.01 CHERRY ANGIOMA: ICD-10-CM

## 2023-08-16 DIAGNOSIS — L81.4 LENTIGINES: ICD-10-CM

## 2023-08-16 DIAGNOSIS — L57.0 ACTINIC KERATOSIS: ICD-10-CM

## 2023-08-16 PROCEDURE — 99214 OFFICE O/P EST MOD 30 MIN: CPT | Mod: 25 | Performed by: PHYSICIAN ASSISTANT

## 2023-08-16 PROCEDURE — 17003 DESTRUCT PREMALG LES 2-14: CPT | Performed by: PHYSICIAN ASSISTANT

## 2023-08-16 PROCEDURE — 17000 DESTRUCT PREMALG LESION: CPT | Performed by: PHYSICIAN ASSISTANT

## 2023-08-16 RX ORDER — FLUOROURACIL 50 MG/G
CREAM TOPICAL
Qty: 40 G | Refills: 0 | Status: SHIPPED | OUTPATIENT
Start: 2023-08-16 | End: 2024-01-25

## 2023-08-16 NOTE — NURSING NOTE
Dimitris Munoz's goals for this visit include:   Chief Complaint   Patient presents with    Skin Check     Patient here for a skin check, areas of concern center back spot, R ear fold. Hx of BCC, family HX of melanoma        He requests these members of his care team be copied on today's visit information:     PCP: Byron Modi    Referring Provider:  No referring provider defined for this encounter.    There were no vitals taken for this visit.    Do you need any medication refills at today's visit?         Carlita Villarreal EMT

## 2023-08-16 NOTE — LETTER
2023         RE: Dimitris Munoz  [address] [address] [address]        Dear Colleague,    Thank you for referring your patient, Dimitris Munoz, to the [facility]. Please see a copy of my visit note below.    [facility] Dermatology Note  Encounter Date: Aug 16, 2023  Office Visit     Dermatology Problem List:  Last skin check 23, recommended yearly.  1. Family history of melanoma (father, ; paternal aunt)  2. BCC, right lateral neck, s/p excision 2018  3. Actinic Keratosis, s/p cryotherapy   - HAK, mid back s/p bx  8/10/22 s/p cryo   - right cheek has been tx with cryo 2x - efudex initiated 23  4. History of benign biopsy  - BLK, left upper arm s/p bx 8/10/22     Social History: Works as an  for [employer], rides bike to work. Has a cabin he spends time at. Wears sunscreen fairly diligently.  Family History: Father () and paternal aunt had melanoma.  ____________________________________________    Assessment & Plan:    # Family history of melanoma -father, paternal aunt  - ABCDEs: Counseled ABCDEs of melanoma: Asymmetry, Border (irregularity), Color (not uniform, changes in color), Diameter (greater than 6 mm which is about the size of a pencil eraser), and Evolving (any changes in preexisting moles).  - Sun protection: Counseled SPF30+ sunscreen, UPF clothing, sun avoidance, tanning bed avoidance.    - Continue annual skin exams    # Multiple clinically benign nevi on the trunk and extremities. No treatment is necessary at this time unless the lesion changes or becomes symptomatic.    - ABCDEs of melanoma were discussed and self skin checks were advised.    # Seborrheic keratosis, non irritated on the trunk and extremities. Explained to patient benign nature of lesion. No treatment is necessary at this time unless the lesion changes or becomes symptomatic.     - Monitor for changes.    # Lindquist Angiomas -  trunk and extremities. Explained to patient benign nature of lesion. No treatment is necessary at this time unless the lesion changes or becomes symptomatic.      # Solar lentigines on the sun exposed skin areas. Benign nature was discussed. No further intervention required at this time.    - Sun precaution was advised including the use of sun screens of SPF 30 or higher, sun protective clothing, and avoidance of tanning beds.      # Hx BCC  - Sunscreen: Apply 20 minutes prior to going outdoors and reapply every two hours, when wet or sweating. We recommend using an SPF 30 or higher, and to use one that is water resistant.     - Advised to monitor for changing, non-healing, bleeding, painful, changing, or otherwise symptomatic lesions  - Continue annual skin exams    # AKs - face, L shoulder x6  - cryo - see below  - lesion on the L cheek has been treated twice with cryo, today there is a very slight grittiness to the area - will elect to trial field therapy with efudex as opposed to bx today  - start efudex 5% cream BID to AA on the cheek - wash hands after use - expect results discussed    Procedures Performed:   - Cryotherapy procedure note: After verbal consent and discussion of risks and benefits including but no limited to dyspigmentation/scar, blister, and pain, 6 was(were) treated with 1-2mm freeze border for 2 cycles with liquid nitrogen. Post cryotherapy instructions were provided.     Follow-up: 1 year(s) in-person, or earlier for new or changing lesions    Staff and Scribe:     Scribe Disclosure:   I, Archana Meredith, am serving as a scribe to document services personally performed by this physician, Brittney Mason PA-C, based on data collection and the provider's statements to me.   Provider Disclosure:   The documentation recorded by the scribe accurately reflects the services I personally performed and the decisions made by me.    All risks, benefits and alternatives were discussed with  patient.  Patient is in agreement and understands the assessment and plan.  All questions were answered.    Brittney Mason PA-C, MPAS  [facility]: Phone: [phone], Fax: [phone]  [facility]: Phone: [phone],  Fax: [phone]  [facility]: Phone: [phone], Fax: [phone]    ____________________________________________    CC: No chief complaint on file.    HPI:  Mr. Dimitris Munoz is a(n) 57 year old male who presents today as a return patient for a skin check.     Last seen 9/28/22 for a spot check. At that time 1 AK was treated with cryo. Fhx melanoma (father) and personal hx of BCC as well as AKs in the past.    Today, notes R cheek lesion is much better, but he does not a bit of texture at the site.     Patient is otherwise feeling well, without additional skin concerns.    Labs Reviewed:  N/A    Physical Exam:  Vitals: There were no vitals taken for this visit.  SKIN: Total skin excluding the undergarment areas was performed. The exam included the head/face, neck, both arms, chest, back, abdomen, both legs, digits and/or nails.   - Gay type II.  - well healed scar on R lateral neck, NERD  - There are erythematous macules with overlying adherent scale on the L cheek x1, L shoulder x2 and forehead x3.   - There are dome shaped bright red papules on the trunk and extremities.   - Multiple regular brown pigmented macules and papules are identified on the trunk and extremities, >100.  - Scattered brown macules on sun exposed areas.  - There are waxy stuck on tan to brown papules on the trunk and extremities.    - No other lesions of concern on areas examined.     Medications:  Current Outpatient Medications   Medication     aspirin (ASA) 81 MG EC tablet     doxycycline monohydrate (ADOXA) 100 MG tablet     IBUPROFEN PO     indomethacin (INDOCIN) 50 MG capsule     Omeprazole  (PRILOSEC PO)     sildenafil (VIAGRA) 100 MG tablet     sulfamethoxazole-trimethoprim (BACTRIM DS) 800-160 MG tablet     tamsulosin (FLOMAX) 0.4 MG capsule     No current facility-administered medications for this visit.      Past Medical History:   Patient Active Problem List   Diagnosis     CARDIOVASCULAR SCREENING; LDL GOAL LESS THAN 160     High triglycerides     FH: melanoma     Erectile dysfunction, unspecified erectile dysfunction type     Acute gouty arthritis     Cellulitis and abscess of foot excluding toe     Perioral dermatitis     Past Medical History:   Diagnosis Date     CARDIOVASCULAR SCREENING; LDL GOAL LESS THAN 160          Again, thank you for allowing me to participate in the care of your patient.        Sincerely,        Brittney Mason PA-C

## 2023-09-10 DIAGNOSIS — R35.0 URINARY FREQUENCY: ICD-10-CM

## 2023-09-12 RX ORDER — TAMSULOSIN HYDROCHLORIDE 0.4 MG/1
0.4 CAPSULE ORAL DAILY
Qty: 90 CAPSULE | Refills: 1 | Status: SHIPPED | OUTPATIENT
Start: 2023-09-12 | End: 2024-01-25

## 2023-12-03 ENCOUNTER — HEALTH MAINTENANCE LETTER (OUTPATIENT)
Age: 58
End: 2023-12-03

## 2024-01-06 NOTE — LETTER
2018         RE: Dimitris Munoz  [address] [address] [facility] [address]        Dear Colleague,    Thank you for referring your patient, Dimitris Munoz, to the [facility]. Please see a copy of my visit note below.    DERMATOLOGIC EXCISION SURGERY PROCEDURE NOTE   Dermatology Problem List:  1. Family history of melanoma (father, ; also aunt and uncle on father's side)  2. BCC, right lateral neck - s/p excision 18   3. Actinic Keratosis - s/p cryotherapy     NAME OF PROCEDURE: Excision with complex linear closure  Staff surgeon: Cali Mcguire DO  Resident: Barbara Bean MD  Scrub nurse: Korin    PRE-OPERATIVE DIAGNOSIS:  Basal Cell Carcinoma  POST-OPERATIVE DIAGNOSIS: Same   LOCATION: Right lateral neck  FINAL EXCISION SIZE(DEFECT SIZE): 2.0 cm  MARGIN: 0.4 cm  FINAL REPAIR LENGTH: 5.2 cm   ANESTHESIA:  1% lidocaine with 1:100,000 epinephrine    INDICATIONS: This patient presented with a 1.2cm Basal Cell Carcinoma.. Excision was indicated.   We discussed the principles of treatment and most likely complications including bleeding, infection, scarring, alteration in skin color and sensation, wound dehiscence,muscle weakness in the area, or recurrence of the lesion or disease. We reviewed that on occasion, after healing, a secondary procedure or revision may be recommended in order to obtain the best cosmetic or functional result.   PROCEDURE: The patient was taken to the operative suite. Time-out was performed. The treatment area was anesthetized. The area was washed with Hibiclens, rinsed with saline and draped with sterile towels. The lesion was delineated and excised down to deep subcutaneous fat in a fusiform manner. Hemostasis was obtained by electrocoagulation.     REPAIR: A complex layered linear closure was selected as the procedure which would maximally preserve both function and cosmesis and for the following reasons: 1) the defect was widely undermined;  2) multiple deep plication and layered sutures placed; 3) wound size, depth, tension, and location.   After the excision of the tumor, the area was extensively and carefully undermined using blunt Metzenbaum scissors. Hemostasis was obtained with spot electrocautery and ligation of vessels where necessary. An initial deep plication sutures of 4-0 Vicryl sutures  were placed in the deep, subcutaneous and fascial planes to appose the lateral margins.  The subcutaneous and dermal layers were then closed with additional Vicryl sutures. The epidermis was then carefully approximated along the length of the wound using chromic gut running sutures.    Estimated blood loss was less than 10 ml for all surgical sites. A sterile pressure dressing was applied and wound care instructions, with a written handout, were given. The [facility] was [facility] alert and ambulatory.    Follow up in Dermatology Clinic for wound check as needed.     Dr. Mcguire staffed the patient and was present for the key portions of the above procedure.     Staff Involved:  Resident/Staff     Barbara Bean MD  PGY-2, Dermatology  Staff Physician Comments:   I saw and evaluated the patient with the resident (Dr. Barbara Bean) and I agree with the above description of the procedure.   I was physically present for all key portions of the procedure today. I was immediately available at all times.    Cali Mcguire DO    Department of Dermatology  [facility]: Phone: [phone], Fax:[phone]  [facility]: Phone: [phone], Fax: [phone]      Again, thank you for allowing me to participate in the care of your patient.        Sincerely,        Cali Mcguire MD     complains of pain/discomfort

## 2024-01-23 ASSESSMENT — ENCOUNTER SYMPTOMS
ABDOMINAL PAIN: 0
HEMATURIA: 0
WEAKNESS: 0
CHILLS: 0
PALPITATIONS: 0
EYE PAIN: 0
ARTHRALGIAS: 0
HEMATOCHEZIA: 0
CONSTIPATION: 0
NERVOUS/ANXIOUS: 0
DYSURIA: 0
DIARRHEA: 0
NAUSEA: 0
HEADACHES: 0
FEVER: 0
JOINT SWELLING: 0
HEARTBURN: 0
DIZZINESS: 0
FREQUENCY: 0
PARESTHESIAS: 0
SORE THROAT: 0
SHORTNESS OF BREATH: 0
COUGH: 0
MYALGIAS: 0

## 2024-01-25 ENCOUNTER — OFFICE VISIT (OUTPATIENT)
Dept: FAMILY MEDICINE | Facility: CLINIC | Age: 59
End: 2024-01-25
Payer: COMMERCIAL

## 2024-01-25 VITALS
RESPIRATION RATE: 16 BRPM | WEIGHT: 234 LBS | HEART RATE: 61 BPM | HEIGHT: 71 IN | TEMPERATURE: 97.2 F | DIASTOLIC BLOOD PRESSURE: 82 MMHG | SYSTOLIC BLOOD PRESSURE: 166 MMHG | BODY MASS INDEX: 32.76 KG/M2 | OXYGEN SATURATION: 98 %

## 2024-01-25 DIAGNOSIS — R03.0 ELEVATED BP WITHOUT DIAGNOSIS OF HYPERTENSION: ICD-10-CM

## 2024-01-25 DIAGNOSIS — Z00.00 ROUTINE GENERAL MEDICAL EXAMINATION AT A HEALTH CARE FACILITY: Primary | ICD-10-CM

## 2024-01-25 DIAGNOSIS — R35.0 URINARY FREQUENCY: ICD-10-CM

## 2024-01-25 LAB
BASOPHILS # BLD AUTO: 0 10E3/UL (ref 0–0.2)
BASOPHILS NFR BLD AUTO: 0 %
EOSINOPHIL # BLD AUTO: 0.1 10E3/UL (ref 0–0.7)
EOSINOPHIL NFR BLD AUTO: 2 %
ERYTHROCYTE [DISTWIDTH] IN BLOOD BY AUTOMATED COUNT: 12.3 % (ref 10–15)
HCT VFR BLD AUTO: 46.3 % (ref 40–53)
HGB BLD-MCNC: 15.9 G/DL (ref 13.3–17.7)
IMM GRANULOCYTES # BLD: 0.1 10E3/UL
IMM GRANULOCYTES NFR BLD: 2 %
LYMPHOCYTES # BLD AUTO: 1.8 10E3/UL (ref 0.8–5.3)
LYMPHOCYTES NFR BLD AUTO: 28 %
MCH RBC QN AUTO: 31.4 PG (ref 26.5–33)
MCHC RBC AUTO-ENTMCNC: 34.3 G/DL (ref 31.5–36.5)
MCV RBC AUTO: 91 FL (ref 78–100)
MONOCYTES # BLD AUTO: 0.5 10E3/UL (ref 0–1.3)
MONOCYTES NFR BLD AUTO: 9 %
NEUTROPHILS # BLD AUTO: 3.8 10E3/UL (ref 1.6–8.3)
NEUTROPHILS NFR BLD AUTO: 60 %
PLATELET # BLD AUTO: 219 10E3/UL (ref 150–450)
RBC # BLD AUTO: 5.07 10E6/UL (ref 4.4–5.9)
WBC # BLD AUTO: 6.3 10E3/UL (ref 4–11)

## 2024-01-25 PROCEDURE — 85025 COMPLETE CBC W/AUTO DIFF WBC: CPT | Performed by: PHYSICIAN ASSISTANT

## 2024-01-25 PROCEDURE — 80053 COMPREHEN METABOLIC PANEL: CPT | Performed by: PHYSICIAN ASSISTANT

## 2024-01-25 PROCEDURE — 90750 HZV VACC RECOMBINANT IM: CPT | Performed by: PHYSICIAN ASSISTANT

## 2024-01-25 PROCEDURE — G0103 PSA SCREENING: HCPCS | Performed by: PHYSICIAN ASSISTANT

## 2024-01-25 PROCEDURE — 80061 LIPID PANEL: CPT | Performed by: PHYSICIAN ASSISTANT

## 2024-01-25 PROCEDURE — 90471 IMMUNIZATION ADMIN: CPT | Performed by: PHYSICIAN ASSISTANT

## 2024-01-25 PROCEDURE — 36415 COLL VENOUS BLD VENIPUNCTURE: CPT | Performed by: PHYSICIAN ASSISTANT

## 2024-01-25 PROCEDURE — 99213 OFFICE O/P EST LOW 20 MIN: CPT | Mod: 25 | Performed by: PHYSICIAN ASSISTANT

## 2024-01-25 PROCEDURE — 84443 ASSAY THYROID STIM HORMONE: CPT | Performed by: PHYSICIAN ASSISTANT

## 2024-01-25 PROCEDURE — 99396 PREV VISIT EST AGE 40-64: CPT | Mod: 25 | Performed by: PHYSICIAN ASSISTANT

## 2024-01-25 RX ORDER — TAMSULOSIN HYDROCHLORIDE 0.4 MG/1
0.4 CAPSULE ORAL DAILY
Qty: 90 CAPSULE | Refills: 1 | Status: SHIPPED | OUTPATIENT
Start: 2024-01-25

## 2024-01-25 ASSESSMENT — ENCOUNTER SYMPTOMS
DYSURIA: 0
ARTHRALGIAS: 0
SORE THROAT: 0
PALPITATIONS: 0
FREQUENCY: 0
CONSTIPATION: 0
SHORTNESS OF BREATH: 0
JOINT SWELLING: 0
NAUSEA: 0
DIARRHEA: 0
NERVOUS/ANXIOUS: 0
WEAKNESS: 0
HEADACHES: 0
CHILLS: 0
HEMATURIA: 0
ABDOMINAL PAIN: 0
DIZZINESS: 0
FEVER: 0
EYE PAIN: 0
MYALGIAS: 0
COUGH: 0

## 2024-01-25 ASSESSMENT — PAIN SCALES - GENERAL: PAINLEVEL: NO PAIN (0)

## 2024-01-25 NOTE — PROGRESS NOTES
Preventive Care Visit  [facility]  Byron Modi PA-C, Family Medicine  Jan 25, 2024      SUBJECTIVE:   Roland is a 58 year old, presenting for the following:  Physical      Healthy Habits:     Getting at least 3 servings of Calcium per day:  NO    Bi-annual eye exam:  Yes    Dental care twice a year:  Yes    Sleep apnea or symptoms of sleep apnea:  Daytime drowsiness and Excessive snoring    Diet:  Breakfast skipped    Frequency of exercise:  1 day/week    Duration of exercise:  15-30 minutes    Taking medications regularly:  Yes    Medication side effects:  None    Additional concerns today:  No      Social History     Tobacco Use    Smoking status: Never    Smokeless tobacco: Never   Substance Use Topics    Alcohol use: Yes     Comment: 10 beers per week             1/23/2024     1:45 PM   Alcohol Use   Prescreen: >3 drinks/day or >7 drinks/week? No     Last PSA:   PSA   Date Value Ref Range Status   06/23/2009 0.78 0 - 4 ug/L Final     Prostate Specific Antigen Screen   Date Value Ref Range Status   09/16/2022 1.22 0.00 - 4.00 ug/L Final       Reviewed orders with patient. Reviewed health maintenance and updated orders accordingly - Yes  Lab work is in process  Labs reviewed in EPIC  BP Readings from Last 3 Encounters:   01/25/24 (!) 166/82   12/16/22 130/78   09/16/22 (!) 143/84    Wt Readings from Last 3 Encounters:   01/25/24 106.1 kg (234 lb)   12/16/22 109 kg (240 lb 6.4 oz)   09/16/22 108 kg (238 lb)                  Patient Active Problem List   Diagnosis    CARDIOVASCULAR SCREENING; LDL GOAL LESS THAN 160    High triglycerides    FH: melanoma    Erectile dysfunction, unspecified erectile dysfunction type    Acute gouty arthritis    Cellulitis and abscess of foot excluding toe    Perioral dermatitis    BMI 32.0-32.9,adult     Past Surgical History:   Procedure Laterality Date    COLONOSCOPY WITH CO2 INSUFFLATION N/A 4/2/2015    Procedure: COLONOSCOPY WITH CO2 INSUFFLATION;   Surgeon: Alfredo Kowalski MD;  Location: [facility] OR    HERNIA REPAIR         Social History     Tobacco Use    Smoking status: Never    Smokeless tobacco: Never   Substance Use Topics    Alcohol use: Yes     Comment: 10 beers per week     Family History   Problem Relation Age of Onset    Cancer Father 53        skin cancer         Current Outpatient Medications   Medication Sig Dispense Refill    tamsulosin (FLOMAX) 0.4 MG capsule Take 1 capsule (0.4 mg) by mouth daily 90 capsule 1    aspirin (ASA) 81 MG EC tablet Take 1 tablet (81 mg) by mouth daily      doxycycline monohydrate (ADOXA) 100 MG tablet Take 100 mg by mouth      indomethacin (INDOCIN) 50 MG capsule Take 1 capsule (50 mg) by mouth 3 times daily (with meals) Stop when pain resolved 30 capsule 0    Omeprazole (PRILOSEC PO)       sildenafil (VIAGRA) 100 MG tablet Take 1 tablet (100 mg) by mouth daily as needed (ED) 30 tablet 3    sulfamethoxazole-trimethoprim (BACTRIM DS) 800-160 MG tablet Take 1 tablet by mouth 2 times daily With daily yogurt or probiotics 14 tablet 0     No Known Allergies    Reviewed and updated as needed this visit by clinical staff   Tobacco  Allergies  Meds  Problems  Med Hx  Surg Hx  Fam Hx          Reviewed and updated as needed this visit by Provider   Tobacco  Allergies  Meds  Problems  Med Hx  Surg Hx  Fam Hx            Past Medical History:   Diagnosis Date    CARDIOVASCULAR SCREENING; LDL GOAL LESS THAN 160       Past Surgical History:   Procedure Laterality Date    COLONOSCOPY WITH CO2 INSUFFLATION N/A 4/2/2015    Procedure: COLONOSCOPY WITH CO2 INSUFFLATION;  Surgeon: Alfredo Kowalski MD;  Location: [facility] OR    HERNIA REPAIR       Review of Systems   Constitutional:  Negative for chills and fever.   HENT:  Negative for congestion, ear pain, hearing loss and sore throat.    Eyes:  Negative for pain and visual disturbance.   Respiratory:  Negative for cough and shortness of breath.    Cardiovascular:   "Negative for chest pain and palpitations.   Gastrointestinal:  Negative for abdominal pain, constipation, diarrhea and nausea.   Genitourinary:  Negative for dysuria, frequency, genital sores, hematuria, penile discharge and urgency.   Musculoskeletal:  Negative for arthralgias, joint swelling and myalgias.   Skin:  Negative for rash.   Neurological:  Negative for dizziness, weakness and headaches.   Psychiatric/Behavioral:  The patient is not nervous/anxious.        OBJECTIVE:   BP (!) 166/82   Pulse 61   Temp 97.2  F (36.2  C) (Tympanic)   Resp 16   Ht 1.803 m (5' 11\")   Wt 106.1 kg (234 lb)   SpO2 98%   BMI 32.64 kg/m     Estimated body mass index is 32.64 kg/m  as calculated from the following:    Height as of this encounter: 1.803 m (5' 11\").    Weight as of this encounter: 106.1 kg (234 lb).  Physical Exam  GENERAL: alert and no distress  EYES: Eyes grossly normal to inspection, PERRL and conjunctivae and sclerae normal  HENT: ear canals and TM's normal, nose and mouth without ulcers or lesions  NECK: no adenopathy, no asymmetry, masses, or scars  RESP: lungs clear to auscultation - no rales, rhonchi or wheezes  CV: regular rate and rhythm, normal S1 S2, no S3 or S4, no murmur, click or rub, no peripheral edema  ABDOMEN: soft, nontender, no hepatosplenomegaly, no masses and bowel sounds normal   (male): normal male genitalia without lesions or urethral discharge, no hernia  MS: no gross musculoskeletal defects noted, no edema  SKIN: no suspicious lesions or rashes  NEURO: Normal strength and tone, mentation intact and speech normal  PSYCH: mentation appears normal, affect normal/bright  LYMPH: no cervical, supraclavicular, axillary, or inguinal adenopathy    Diagnostic Test Results:  Labs reviewed in Epic    ASSESSMENT/PLAN:       ICD-10-CM    1. Routine general medical examination at a health care facility  Z00.00 PRIMARY CARE FOLLOW-UP SCHEDULING     Lipid panel reflex to direct LDL Fasting     " Comprehensive metabolic panel (BMP + Alb, Alk Phos, ALT, AST, Total. Bili, TP)     PSA, screen     TSH with free T4 reflex     CBC with platelets and differential      2. BMI 32.0-32.9,adult  Z68.32       3. Elevated BP without diagnosis of hypertension  R03.0       4. Urinary frequency  R35.0 tamsulosin (FLOMAX) 0.4 MG capsule        Work on Healthy diet and exercise. Getting heart rate elevated for 30 mins most days of week.  Labs pending.   3. See Patient Instructions. Wants to work on diet and exercise. Recheck 4 wks.       Counseling  Reviewed preventive health counseling, as reflected in patient instructions       Regular exercise       Healthy diet/nutrition       Vision screening        He reports that he has never smoked. He has never used smokeless tobacco.            Signed Electronically by: Byron Modi PA-C  Answers submitted by the patient for this visit:  Annual Preventive Visit (Submitted on 1/23/2024)  Chief Complaint: Annual Exam:  Blood in stool: No  heartburn: No  peripheral edema: No  mood changes: No  Skin sensation changes: No  impotence: Yes

## 2024-01-26 LAB
ALBUMIN SERPL BCG-MCNC: 4.4 G/DL (ref 3.5–5.2)
ALP SERPL-CCNC: 82 U/L (ref 40–150)
ALT SERPL W P-5'-P-CCNC: 30 U/L (ref 0–70)
ANION GAP SERPL CALCULATED.3IONS-SCNC: 12 MMOL/L (ref 7–15)
AST SERPL W P-5'-P-CCNC: 23 U/L (ref 0–45)
BILIRUB SERPL-MCNC: 0.7 MG/DL
BUN SERPL-MCNC: 14.8 MG/DL (ref 8–23)
CALCIUM SERPL-MCNC: 9.3 MG/DL (ref 8.8–10.2)
CHLORIDE SERPL-SCNC: 102 MMOL/L (ref 98–107)
CHOLEST SERPL-MCNC: 194 MG/DL
CREAT SERPL-MCNC: 1.2 MG/DL (ref 0.67–1.17)
DEPRECATED HCO3 PLAS-SCNC: 27 MMOL/L (ref 22–29)
EGFRCR SERPLBLD CKD-EPI 2021: 69 ML/MIN/1.73M2
FASTING STATUS PATIENT QL REPORTED: YES
GLUCOSE SERPL-MCNC: 84 MG/DL (ref 70–99)
HDLC SERPL-MCNC: 27 MG/DL
LDLC SERPL CALC-MCNC: 108 MG/DL
NONHDLC SERPL-MCNC: 167 MG/DL
POTASSIUM SERPL-SCNC: 4.2 MMOL/L (ref 3.4–5.3)
PROT SERPL-MCNC: 7 G/DL (ref 6.4–8.3)
PSA SERPL DL<=0.01 NG/ML-MCNC: 0.89 NG/ML (ref 0–4.5)
SODIUM SERPL-SCNC: 141 MMOL/L (ref 135–145)
TRIGL SERPL-MCNC: 297 MG/DL
TSH SERPL DL<=0.005 MIU/L-ACNC: 1.46 UIU/ML (ref 0.3–4.2)

## 2024-02-22 ENCOUNTER — OFFICE VISIT (OUTPATIENT)
Dept: FAMILY MEDICINE | Facility: CLINIC | Age: 59
End: 2024-02-22
Payer: COMMERCIAL

## 2024-02-22 VITALS
SYSTOLIC BLOOD PRESSURE: 152 MMHG | RESPIRATION RATE: 14 BRPM | BODY MASS INDEX: 33.18 KG/M2 | TEMPERATURE: 97.3 F | HEART RATE: 62 BPM | OXYGEN SATURATION: 99 % | DIASTOLIC BLOOD PRESSURE: 80 MMHG | HEIGHT: 71 IN | WEIGHT: 237 LBS

## 2024-02-22 DIAGNOSIS — Z91.89 RISK FOR CORONARY ARTERY DISEASE BETWEEN 10% AND 20% IN NEXT 10 YEARS PER FRAMINGHAM SCORE: ICD-10-CM

## 2024-02-22 DIAGNOSIS — I10 HYPERTENSION GOAL BP (BLOOD PRESSURE) < 140/90: Primary | ICD-10-CM

## 2024-02-22 PROCEDURE — 99213 OFFICE O/P EST LOW 20 MIN: CPT | Performed by: PHYSICIAN ASSISTANT

## 2024-02-22 RX ORDER — LISINOPRIL 10 MG/1
10 TABLET ORAL DAILY
Qty: 30 TABLET | Refills: 0 | Status: SHIPPED | OUTPATIENT
Start: 2024-02-22 | End: 2024-03-28

## 2024-02-22 ASSESSMENT — PAIN SCALES - GENERAL: PAINLEVEL: NO PAIN (0)

## 2024-02-22 NOTE — PROGRESS NOTES
"  Assessment & Plan       ICD-10-CM    1. Hypertension goal BP (blood pressure) < 140/90  I10 lisinopril (ZESTRIL) 10 MG tablet      2. Risk for coronary artery disease between 10% and 20% in next 10 years per [place] score  Z91.89         Start lisinopril 10mg daily. warning signs discussed. side effects discussed. Recheck 4 wks.   Deferred statin therapy for now.     BMI  Estimated body mass index is 33.05 kg/m  as calculated from the following:    Height as of this encounter: 1.803 m (5' 11\").    Weight as of this encounter: 107.5 kg (237 lb).   Weight management plan: Discussed healthy diet and exercise guidelines    Gema Watkins is a 58 year old, presenting for the following health issues:  Hypertension        2/22/2024     3:09 PM   Additional Questions   Roomed by amber   Accompanied by self         2/22/2024     3:09 PM   Patient Reported Additional Medications   Patient reports taking the following new medications no     History of Present Illness       Hypertension: He presents for follow up of hypertension.  He does not check blood pressure  regularly outside of the clinic. Outside blood pressures have been over 140/90. He does not follow a low salt diet.     He eats 2-3 servings of fruits and vegetables daily.He consumes 0 sweetened beverage(s) daily.He exercises with enough effort to increase his heart rate 20 to 29 minutes per day.  He exercises with enough effort to increase his heart rate 3 or less days per week. He is missing 1 dose(s) of medications per week.  He is not taking prescribed medications regularly due to remembering to take.  Patient is here for follow-up to recheck his blood pressure.  He has been monitoring his blood pressure at home and his blood pressures in the high 140s to low 150s over 80s.  He denies any chest pain or shortness of breath or headaches or dizziness.    Review of Systems  Constitutional, HEENT, cardiovascular, pulmonary, gi and gu systems are negative, " "except as otherwise noted.      Objective    BP (!) 152/80   Pulse 62   Temp 97.3  F (36.3  C) (Tympanic)   Resp 14   Ht 1.803 m (5' 11\")   Wt 107.5 kg (237 lb)   SpO2 99%   BMI 33.05 kg/m    Body mass index is 33.05 kg/m .  Physical Exam   GENERAL: alert and no distress    RESP: lungs clear to auscultation - no rales, rhonchi or wheezes  CV: regular rate and rhythm, normal S1 S2, no S3 or S4, no murmur, click or rub, no peripheral edema  MS: no gross musculoskeletal defects noted, no edema    Labs reviewed        The 10-year ASCVD risk score (Xiomara DK, et al., 2019) is: 17.1%    Values used to calculate the score:      Age: 58 years      Sex: Male      Is Non- : No      Diabetic: No      Tobacco smoker: No      Systolic Blood Pressure: 152 mmHg      Is BP treated: No      HDL Cholesterol: 27 mg/dL      Total Cholesterol: 194 mg/dL   Signed Electronically by: Byron Modi PA-C    "

## 2024-03-28 ENCOUNTER — OFFICE VISIT (OUTPATIENT)
Dept: FAMILY MEDICINE | Facility: CLINIC | Age: 59
End: 2024-03-28
Payer: COMMERCIAL

## 2024-03-28 VITALS
WEIGHT: 236 LBS | RESPIRATION RATE: 16 BRPM | TEMPERATURE: 97 F | DIASTOLIC BLOOD PRESSURE: 80 MMHG | OXYGEN SATURATION: 99 % | BODY MASS INDEX: 32.92 KG/M2 | SYSTOLIC BLOOD PRESSURE: 154 MMHG | HEART RATE: 59 BPM

## 2024-03-28 DIAGNOSIS — I10 HYPERTENSION GOAL BP (BLOOD PRESSURE) < 140/90: ICD-10-CM

## 2024-03-28 PROCEDURE — 93000 ELECTROCARDIOGRAM COMPLETE: CPT | Performed by: PHYSICIAN ASSISTANT

## 2024-03-28 PROCEDURE — 99213 OFFICE O/P EST LOW 20 MIN: CPT | Mod: 25 | Performed by: PHYSICIAN ASSISTANT

## 2024-03-28 RX ORDER — LISINOPRIL 20 MG/1
20 TABLET ORAL DAILY
Qty: 30 TABLET | Refills: 0 | Status: SHIPPED | OUTPATIENT
Start: 2024-03-28 | End: 2024-04-22

## 2024-03-28 ASSESSMENT — PAIN SCALES - GENERAL: PAINLEVEL: NO PAIN (0)

## 2024-03-28 NOTE — PROGRESS NOTES
Assessment & Plan       ICD-10-CM    1. Hypertension goal BP (blood pressure) < 140/90  I10 lisinopril (ZESTRIL) 20 MG tablet     EKG 12-lead complete w/read - Clinics        Work on Healthy diet and exercise. Getting heart rate elevated for 30 mins most days of week.  Increase lisinopril to 20mg daily.   Recheck 4 wks. warning signs discussed. side effects discussed     Subjective   Roland is a 58 year old, presenting for the following health issues:  Hypertension    History of Present Illness       Hypertension: He presents for follow up of hypertension.  He does not check blood pressure  regularly outside of the clinic. Outside blood pressures have been over 140/90. He does not follow a low salt diet.     He eats 2-3 servings of fruits and vegetables daily.He consumes 0 sweetened beverage(s) daily.He exercises with enough effort to increase his heart rate 30 to 60 minutes per day.  He exercises with enough effort to increase his heart rate 3 or less days per week. He is missing 2 dose(s) of medications per week.       Hypertension Follow-up    Do you check your blood pressure regularly outside of the clinic? Yes   Are you following a low salt diet? Yes  Are your blood pressures ever more than 140 on the top number (systolic) OR more   than 90 on the bottom number (diastolic), for example 140/90? Yes  150's/90's at home.       Review of Systems  Constitutional, HEENT, cardiovascular, pulmonary, gi and gu systems are negative, except as otherwise noted.      Objective    BP (!) 154/80   Pulse 59   Temp 97  F (36.1  C) (Tympanic)   Resp 16   Wt 107 kg (236 lb)   SpO2 99%   BMI 32.92 kg/m    Body mass index is 32.92 kg/m .  Physical Exam   GENERAL: alert and no distress  RESP: lungs clear to auscultation - no rales, rhonchi or wheezes  CV: regular rate and rhythm, normal S1 S2, no S3 or S4, no murmur, click or rub, no peripheral edema  MS: no gross musculoskeletal defects noted, no edema    EKG - Reviewed and  interpreted by me appears normal, NSR, sinus bradycardia, normal axis, normal intervals, no acute ST/T changes c/w ischemia, no LVH by voltage criteria, there are no prior tracings available        Signed Electronically by: Byron Modi PA-C

## 2024-04-20 DIAGNOSIS — I10 HYPERTENSION GOAL BP (BLOOD PRESSURE) < 140/90: ICD-10-CM

## 2024-04-23 RX ORDER — LISINOPRIL 20 MG/1
20 TABLET ORAL DAILY
Qty: 90 TABLET | Refills: 0 | Status: SHIPPED | OUTPATIENT
Start: 2024-04-23 | End: 2024-04-25 | Stop reason: ALTCHOICE

## 2024-04-25 ENCOUNTER — OFFICE VISIT (OUTPATIENT)
Dept: FAMILY MEDICINE | Facility: CLINIC | Age: 59
End: 2024-04-25
Payer: COMMERCIAL

## 2024-04-25 VITALS
BODY MASS INDEX: 32.62 KG/M2 | WEIGHT: 233 LBS | SYSTOLIC BLOOD PRESSURE: 146 MMHG | OXYGEN SATURATION: 99 % | RESPIRATION RATE: 16 BRPM | DIASTOLIC BLOOD PRESSURE: 82 MMHG | HEIGHT: 71 IN | HEART RATE: 65 BPM | TEMPERATURE: 97 F

## 2024-04-25 DIAGNOSIS — I10 HYPERTENSION GOAL BP (BLOOD PRESSURE) < 140/90: Primary | ICD-10-CM

## 2024-04-25 DIAGNOSIS — E78.1 HIGH TRIGLYCERIDES: ICD-10-CM

## 2024-04-25 PROBLEM — L71.0 PERIORAL DERMATITIS: Status: RESOLVED | Noted: 2022-12-16 | Resolved: 2024-04-25

## 2024-04-25 PROBLEM — L02.619 CELLULITIS AND ABSCESS OF FOOT EXCLUDING TOE: Status: RESOLVED | Noted: 2022-12-16 | Resolved: 2024-04-25

## 2024-04-25 PROBLEM — L03.119 CELLULITIS AND ABSCESS OF FOOT EXCLUDING TOE: Status: RESOLVED | Noted: 2022-12-16 | Resolved: 2024-04-25

## 2024-04-25 PROCEDURE — 99213 OFFICE O/P EST LOW 20 MIN: CPT | Performed by: PHYSICIAN ASSISTANT

## 2024-04-25 RX ORDER — LISINOPRIL AND HYDROCHLOROTHIAZIDE 12.5; 2 MG/1; MG/1
1 TABLET ORAL DAILY
Qty: 30 TABLET | Refills: 0 | Status: SHIPPED | OUTPATIENT
Start: 2024-04-25 | End: 2024-05-28

## 2024-04-25 ASSESSMENT — PAIN SCALES - GENERAL: PAINLEVEL: NO PAIN (0)

## 2024-05-16 ENCOUNTER — OFFICE VISIT (OUTPATIENT)
Dept: FAMILY MEDICINE | Facility: CLINIC | Age: 59
End: 2024-05-16
Payer: COMMERCIAL

## 2024-05-16 VITALS
TEMPERATURE: 97.8 F | DIASTOLIC BLOOD PRESSURE: 89 MMHG | RESPIRATION RATE: 16 BRPM | WEIGHT: 231.4 LBS | BODY MASS INDEX: 32.27 KG/M2 | SYSTOLIC BLOOD PRESSURE: 146 MMHG | OXYGEN SATURATION: 99 % | HEART RATE: 92 BPM

## 2024-05-16 DIAGNOSIS — I49.9 IRREGULAR HEART BEAT: Primary | ICD-10-CM

## 2024-05-16 DIAGNOSIS — I48.91 ATRIAL FIBRILLATION, UNSPECIFIED TYPE (H): ICD-10-CM

## 2024-05-16 PROCEDURE — 99213 OFFICE O/P EST LOW 20 MIN: CPT | Performed by: PHYSICIAN ASSISTANT

## 2024-05-16 PROCEDURE — 93000 ELECTROCARDIOGRAM COMPLETE: CPT | Performed by: PHYSICIAN ASSISTANT

## 2024-05-16 ASSESSMENT — PAIN SCALES - GENERAL: PAINLEVEL: NO PAIN (0)

## 2024-05-16 NOTE — PROGRESS NOTES
Assessment & Plan       ICD-10-CM    1. Irregular heart beat  I49.9 EKG 12-lead complete w/read - Clinics      2. Atrial fibrillation, unspecified type (H)  I48.91       New onset afib, Follow up  with ED.   Called [facility] ED and spoke with charge RN for patient transfer.   warning signs discussed.  Patient is  stable to drive himself to ED.     Gema Watkins is a 58 year old, presenting for the following health issues:  Heart Problem (Watch says irregular heart beat. Feeling anxious )        5/16/2024    11:00 AM   Additional Questions   Roomed by DRU Soni CMA     HPI     Smart watch alerted him that he had abnormal heart beat started last night.   Mild anxious feeling and heart racing feeling since then.   No symptoms: no chest pain, or short of breath, headache or dizziness.     Review of Systems  Constitutional, HEENT, cardiovascular, pulmonary, gi and gu systems are negative, except as otherwise noted.      Objective    BP (!) 146/89   Pulse 92   Temp 97.8  F (36.6  C) (Oral)   Resp 16   Wt 105 kg (231 lb 6.4 oz)   SpO2 99%   BMI 32.27 kg/m    Body mass index is 32.27 kg/m .  Physical Exam   GENERAL: alert and no distress  EYES: Eyes grossly normal to inspection, PERRL and conjunctivae and sclerae normal  HENT: ear canals and TM's normal, nose and mouth without ulcers or lesions  NECK: no adenopathy, no asymmetry, masses, or scars  RESP: lungs clear to auscultation - no rales, rhonchi or wheezes  CV: irregularly, irregular rate and rhythm, normal S1 S2, no S3 or S4, no murmur, click or rub, no peripheral edema  ABDOMEN: soft, nontender, no hepatosplenomegaly, no masses and bowel sounds normal  MS: no gross musculoskeletal defects noted, no edema  SKIN: no suspicious lesions or rashes  NEURO: Normal strength and tone, mentation intact and speech normal  PSYCH: mentation appears normal, affect normal/bright    EKG: atrial fib.  Change since EKG on 3/24.           Signed Electronically by: Byron Telles  TANNER Modi

## 2024-05-28 DIAGNOSIS — I10 HYPERTENSION GOAL BP (BLOOD PRESSURE) < 140/90: ICD-10-CM

## 2024-05-28 RX ORDER — LISINOPRIL AND HYDROCHLOROTHIAZIDE 12.5; 2 MG/1; MG/1
1 TABLET ORAL DAILY
Qty: 30 TABLET | Refills: 0 | Status: SHIPPED | OUTPATIENT
Start: 2024-05-28 | End: 2024-06-25

## 2024-06-24 DIAGNOSIS — I10 HYPERTENSION GOAL BP (BLOOD PRESSURE) < 140/90: ICD-10-CM

## 2024-06-25 RX ORDER — LISINOPRIL AND HYDROCHLOROTHIAZIDE 12.5; 2 MG/1; MG/1
1 TABLET ORAL DAILY
Qty: 90 TABLET | Refills: 0 | Status: SHIPPED | OUTPATIENT
Start: 2024-06-25

## 2024-10-08 DIAGNOSIS — I10 HYPERTENSION GOAL BP (BLOOD PRESSURE) < 140/90: ICD-10-CM

## 2024-10-09 RX ORDER — LISINOPRIL AND HYDROCHLOROTHIAZIDE 12.5; 2 MG/1; MG/1
1 TABLET ORAL DAILY
Qty: 30 TABLET | Refills: 0 | Status: SHIPPED | OUTPATIENT
Start: 2024-10-09

## 2024-12-11 DIAGNOSIS — I10 HYPERTENSION GOAL BP (BLOOD PRESSURE) < 140/90: ICD-10-CM

## 2024-12-12 RX ORDER — LISINOPRIL AND HYDROCHLOROTHIAZIDE 12.5; 2 MG/1; MG/1
1 TABLET ORAL DAILY
Qty: 45 TABLET | Refills: 0 | Status: SHIPPED | OUTPATIENT
Start: 2024-12-12

## 2025-01-09 DIAGNOSIS — I10 HYPERTENSION GOAL BP (BLOOD PRESSURE) < 140/90: ICD-10-CM

## 2025-01-09 DIAGNOSIS — R35.0 URINARY FREQUENCY: ICD-10-CM

## 2025-01-09 RX ORDER — LISINOPRIL AND HYDROCHLOROTHIAZIDE 12.5; 2 MG/1; MG/1
1 TABLET ORAL DAILY
Qty: 90 TABLET | Refills: 0 | Status: SHIPPED | OUTPATIENT
Start: 2025-01-09

## 2025-01-09 RX ORDER — TAMSULOSIN HYDROCHLORIDE 0.4 MG/1
0.4 CAPSULE ORAL DAILY
Qty: 90 CAPSULE | Refills: 0 | Status: SHIPPED | OUTPATIENT
Start: 2025-01-09

## 2025-01-09 NOTE — TELEPHONE ENCOUNTER
Clinic RN: Please investigate patient's chart or contact patient if the information cannot be found because patient should have run out of this medication on 7/25/24. Confirm patient is taking this medication as prescribed. Document findings and route refill encounter to provider for approval or denial.  Jessenia Carbajal RN, BSN  [facility]

## 2025-01-09 NOTE — TELEPHONE ENCOUNTER
Provider: Please refill the requested medications if approprate. He does have an upcoming appointment with you. That is the first available for a physical. Thank you. Chasity Sterling R.N.    Next 5 appointments (look out 90 days)      Feb 26, 2025 7:00 AM  (Arrive by 6:45 AM)  Adult Preventative Visit with Byron Modi PA-C  [facility] ([facility] ) [address] [facility] [address] [phone]          Patient reports that he is still taking the tamsulosin 0.4 mg. He reports that he was not really good at taking it on a regular basis, but is now trying to be a little more consistently. He does note that it helps his symptoms.  He also needs a refill of is Zestoretic.  .

## 2025-02-26 ENCOUNTER — OFFICE VISIT (OUTPATIENT)
Dept: FAMILY MEDICINE | Facility: CLINIC | Age: 60
End: 2025-02-26
Payer: COMMERCIAL

## 2025-02-26 VITALS
HEART RATE: 63 BPM | SYSTOLIC BLOOD PRESSURE: 150 MMHG | WEIGHT: 233 LBS | OXYGEN SATURATION: 98 % | DIASTOLIC BLOOD PRESSURE: 88 MMHG | TEMPERATURE: 96.7 F | RESPIRATION RATE: 16 BRPM | HEIGHT: 71 IN | BODY MASS INDEX: 32.62 KG/M2

## 2025-02-26 DIAGNOSIS — Z00.00 ROUTINE GENERAL MEDICAL EXAMINATION AT A HEALTH CARE FACILITY: Primary | ICD-10-CM

## 2025-02-26 DIAGNOSIS — I48.91 ATRIAL FIBRILLATION, UNSPECIFIED TYPE (H): ICD-10-CM

## 2025-02-26 DIAGNOSIS — M10.9 ACUTE GOUTY ARTHRITIS: ICD-10-CM

## 2025-02-26 DIAGNOSIS — E78.1 HIGH TRIGLYCERIDES: ICD-10-CM

## 2025-02-26 DIAGNOSIS — N52.9 ERECTILE DYSFUNCTION, UNSPECIFIED ERECTILE DYSFUNCTION TYPE: ICD-10-CM

## 2025-02-26 DIAGNOSIS — R35.0 URINARY FREQUENCY: ICD-10-CM

## 2025-02-26 DIAGNOSIS — Z12.5 SCREENING PSA (PROSTATE SPECIFIC ANTIGEN): ICD-10-CM

## 2025-02-26 DIAGNOSIS — I10 HYPERTENSION GOAL BP (BLOOD PRESSURE) < 140/90: ICD-10-CM

## 2025-02-26 LAB
ALBUMIN SERPL BCG-MCNC: 4.2 G/DL (ref 3.5–5.2)
ALP SERPL-CCNC: 72 U/L (ref 40–150)
ALT SERPL W P-5'-P-CCNC: 25 U/L (ref 0–70)
ANION GAP SERPL CALCULATED.3IONS-SCNC: 11 MMOL/L (ref 7–15)
AST SERPL W P-5'-P-CCNC: 23 U/L (ref 0–45)
BILIRUB SERPL-MCNC: 0.8 MG/DL
BUN SERPL-MCNC: 18.8 MG/DL (ref 8–23)
CALCIUM SERPL-MCNC: 9.1 MG/DL (ref 8.8–10.4)
CHLORIDE SERPL-SCNC: 102 MMOL/L (ref 98–107)
CHOLEST SERPL-MCNC: 185 MG/DL
CREAT SERPL-MCNC: 1.3 MG/DL (ref 0.67–1.17)
EGFRCR SERPLBLD CKD-EPI 2021: 63 ML/MIN/1.73M2
FASTING STATUS PATIENT QL REPORTED: YES
FASTING STATUS PATIENT QL REPORTED: YES
GLUCOSE SERPL-MCNC: 110 MG/DL (ref 70–99)
HCO3 SERPL-SCNC: 27 MMOL/L (ref 22–29)
HDLC SERPL-MCNC: 30 MG/DL
LDLC SERPL CALC-MCNC: 88 MG/DL
NONHDLC SERPL-MCNC: 155 MG/DL
POTASSIUM SERPL-SCNC: 3.8 MMOL/L (ref 3.4–5.3)
PROT SERPL-MCNC: 6.6 G/DL (ref 6.4–8.3)
PSA SERPL DL<=0.01 NG/ML-MCNC: 0.8 NG/ML (ref 0–4.5)
SODIUM SERPL-SCNC: 140 MMOL/L (ref 135–145)
TRIGL SERPL-MCNC: 336 MG/DL

## 2025-02-26 PROCEDURE — 80053 COMPREHEN METABOLIC PANEL: CPT | Performed by: PHYSICIAN ASSISTANT

## 2025-02-26 PROCEDURE — 36415 COLL VENOUS BLD VENIPUNCTURE: CPT | Performed by: PHYSICIAN ASSISTANT

## 2025-02-26 PROCEDURE — 80061 LIPID PANEL: CPT | Performed by: PHYSICIAN ASSISTANT

## 2025-02-26 PROCEDURE — G0103 PSA SCREENING: HCPCS | Performed by: PHYSICIAN ASSISTANT

## 2025-02-26 RX ORDER — INDOMETHACIN 50 MG/1
50 CAPSULE ORAL
Qty: 30 CAPSULE | Refills: 0 | Status: SHIPPED | OUTPATIENT
Start: 2025-02-26

## 2025-02-26 RX ORDER — SILDENAFIL 100 MG/1
100 TABLET, FILM COATED ORAL DAILY PRN
Qty: 30 TABLET | Refills: 3 | Status: SHIPPED | OUTPATIENT
Start: 2025-02-26

## 2025-02-26 RX ORDER — TAMSULOSIN HYDROCHLORIDE 0.4 MG/1
0.4 CAPSULE ORAL DAILY
Qty: 90 CAPSULE | Refills: 3 | Status: SHIPPED | OUTPATIENT
Start: 2025-02-26

## 2025-02-26 RX ORDER — LISINOPRIL AND HYDROCHLOROTHIAZIDE 12.5; 2 MG/1; MG/1
2 TABLET ORAL DAILY
Qty: 180 TABLET | Refills: 0 | Status: SHIPPED | OUTPATIENT
Start: 2025-02-26

## 2025-02-26 SDOH — HEALTH STABILITY: PHYSICAL HEALTH: ON AVERAGE, HOW MANY MINUTES DO YOU ENGAGE IN EXERCISE AT THIS LEVEL?: 30 MIN

## 2025-02-26 SDOH — HEALTH STABILITY: PHYSICAL HEALTH: ON AVERAGE, HOW MANY DAYS PER WEEK DO YOU ENGAGE IN MODERATE TO STRENUOUS EXERCISE (LIKE A BRISK WALK)?: 3 DAYS

## 2025-02-26 ASSESSMENT — PAIN SCALES - GENERAL: PAINLEVEL_OUTOF10: NO PAIN (0)

## 2025-02-26 ASSESSMENT — SOCIAL DETERMINANTS OF HEALTH (SDOH): HOW OFTEN DO YOU GET TOGETHER WITH FRIENDS OR RELATIVES?: TWICE A WEEK

## 2025-02-26 NOTE — PATIENT INSTRUCTIONS
Patient Education   Preventive Care Advice   This is general advice given by our system to help you stay healthy. However, your care team may have specific advice just for you. Please talk to your care team about your preventive care needs.  Nutrition  Eat 5 or more servings of fruits and vegetables each day.  Try wheat bread, brown rice and whole grain pasta (instead of white bread, rice, and pasta).  Get enough calcium and vitamin D. Check the label on foods and aim for 100% of the RDA (recommended daily allowance).  Lifestyle  Exercise at least 150 minutes each week  (30 minutes a day, 5 days a week).  Do muscle strengthening activities 2 days a week. These help control your weight and prevent disease.  No smoking.  Wear sunscreen to prevent skin cancer.  Have a dental exam and cleaning every 6 months.  Yearly exams  See your health care team every year to talk about:  Any changes in your health.  Any medicines your care team has prescribed.  Preventive care, family planning, and ways to prevent chronic diseases.  Shots (vaccines)   HPV shots (up to age 26), if you've never had them before.  Hepatitis B shots (up to age 59), if you've never had them before.  COVID-19 shot: Get this shot when it's due.  Flu shot: Get a flu shot every year.  Tetanus shot: Get a tetanus shot every 10 years.  Pneumococcal, hepatitis A, and RSV shots: Ask your care team if you need these based on your risk.  Shingles shot (for age 50 and up)  General health tests  Diabetes screening:  Starting at age 35, Get screened for diabetes at least every 3 years.  If you are younger than age 35, ask your care team if you should be screened for diabetes.  Cholesterol test: At age 39, start having a cholesterol test every 5 years, or more often if advised.  Bone density scan (DEXA): At age 50, ask your care team if you should have this scan for osteoporosis (brittle bones).  Hepatitis C: Get tested at least once in your life.  STIs (sexually  transmitted infections)  Before age 24: Ask your care team if you should be screened for STIs.  After age 24: Get screened for STIs if you're at risk. You are at risk for STIs (including HIV) if:  You are sexually active with more than one person.  You don't use condoms every time.  You or a partner was diagnosed with a sexually transmitted infection.  If you are at risk for HIV, ask about PrEP medicine to prevent HIV.  Get tested for HIV at least once in your life, whether you are at risk for HIV or not.  Cancer screening tests  Cervical cancer screening: If you have a cervix, begin getting regular cervical cancer screening tests starting at age 21.  Breast cancer scan (mammogram): If you've ever had breasts, begin having regular mammograms starting at age 40. This is a scan to check for breast cancer.  Colon cancer screening: It is important to start screening for colon cancer at age 45.  Have a colonoscopy test every 10 years (or more often if you're at risk) Or, ask your provider about stool tests like a FIT test every year or Cologuard test every 3 years.  To learn more about your testing options, visit:   .  For help making a decision, visit:   https://bit.ly/bm50428.  Prostate cancer screening test: If you have a prostate, ask your care team if a prostate cancer screening test (PSA) at age 55 is right for you.  Lung cancer screening: If you are a current or former smoker ages 50 to 80, ask your care team if ongoing lung cancer screenings are right for you.  For informational purposes only. Not to replace the advice of your health care provider. Copyright   2023 [facility]. All rights reserved. Clinically reviewed by the [facility]. 24h00 [id number] - REV 01/24.  Learning About Stress  What is stress?     Stress is your body's response to a hard situation. Your body can have a physical, emotional, or mental response. Stress is a fact of life for most people, and it  affects everyone differently. What causes stress for you may not be stressful for someone else.  A lot of things can cause stress. You may feel stress when you go on a job interview, take a test, or run a race. This kind of short-term stress is normal and even useful. It can help you if you need to work hard or react quickly. For example, stress can help you finish an important job on time.  Long-term stress is caused by ongoing stressful situations or events. Examples of long-term stress include long-term health problems, ongoing problems at work, or conflicts in your family. Long-term stress can harm your health.  How does stress affect your health?  When you are stressed, your body responds as though you are in danger. It makes hormones that speed up your heart, make you breathe faster, and give you a burst of energy. This is called the fight-or-flight stress response. If the stress is over quickly, your body goes back to normal and no harm is done.  But if stress happens too often or lasts too long, it can have bad effects. Long-term stress can make you more likely to get sick, and it can make symptoms of some diseases worse. If you tense up when you are stressed, you may develop neck, shoulder, or low back pain. Stress is linked to high blood pressure and heart disease.  Stress also harms your emotional health. It can make you [place]ura, tense, or depressed. Your relationships may suffer, and you may not do well at work or school.  What can you do to manage stress?  You can try these things to help manage stress:   Do something active. Exercise or activity can help reduce stress. Walking is a great way to get started. Even everyday activities such as housecleaning or yard work can help.  Try yoga or pantera chi. These techniques combine exercise and meditation. You may need some training at first to learn them.  Do something you enjoy. For example, listen to music or go to a movie. Practice your hobby or do volunteer  "work.  Meditate. This can help you relax, because you are not worrying about what happened before or what may happen in the future.  Do guided imagery. Imagine yourself in any setting that helps you feel calm. You can use online videos, books, or a teacher to guide you.  Do breathing exercises. For example:  From a standing position, bend forward from the waist with your knees slightly bent. Let your arms dangle close to the floor.  Breathe in slowly and deeply as you return to a standing position. Roll up slowly and lift your head last.  Hold your breath for just a few seconds in the standing position.  Breathe out slowly and bend forward from the waist.  Let your feelings out. Talk, laugh, cry, and express anger when you need to. Talking with supportive friends or family, a counselor, or a kelly leader about your feelings is a healthy way to relieve stress. Avoid discussing your feelings with people who make you feel worse.  Write. It may help to write about things that are bothering you. This helps you find out how much stress you feel and what is causing it. When you know this, you can find better ways to cope.  What can you do to prevent stress?  You might try some of these things to help prevent stress:  Manage your time. This helps you find time to do the things you want and need to do.  Get enough sleep. Your body recovers from the stresses of the day while you are sleeping.  Get support. Your family, friends, and community can make a difference in how you experience stress.  Limit your news feed. Avoid or limit time on social media or news that may make you feel stressed.  Do something active. Exercise or activity can help reduce stress. Walking is a great way to get started.  Where can you learn more?  Go to https://www.[employer].net/patiented  Enter N032 in the search box to learn more about \"Learning About Stress.\"  Current as of: October 24, 2023  Content Version: 14.3    2024 [employer]. "   Care instructions adapted under license by your healthcare professional. If you have questions about a medical condition or this instruction, always ask your healthcare professional. [employer] disclaims any warranty or liability for your use of this information.

## 2025-02-26 NOTE — PROGRESS NOTES
"Preventive Care Visit  [facility]  Byron Modi PA-C, Family Medicine  Feb 26, 2025      Assessment & Plan       ICD-10-CM    1. Routine general medical examination at a health care facility  Z00.00       2. Hypertension goal BP (blood pressure) < 140/90  I10 Comprehensive metabolic panel (BMP + Alb, Alk Phos, ALT, AST, Total. Bili, TP)     lisinopril-hydrochlorothiazide (ZESTORETIC) 20-12.5 MG tablet     Comprehensive metabolic panel (BMP + Alb, Alk Phos, ALT, AST, Total. Bili, TP)     OFFICE/OUTPT VISIT,EST,LEVL III      3. Atrial fibrillation, unspecified type (H)  I48.91       4. High triglycerides  E78.1 Lipid panel reflex to direct LDL Fasting     Lipid panel reflex to direct LDL Fasting      5. Acute gouty arthritis  M10.9 indomethacin (INDOCIN) 50 MG capsule     OFFICE/OUTPT VISIT,EST,LEVL III      6. Erectile dysfunction, unspecified erectile dysfunction type  N52.9 sildenafil (VIAGRA) 100 MG tablet     OFFICE/OUTPT VISIT,EST,LEVL III      7. Urinary frequency  R35.0 tamsulosin (FLOMAX) 0.4 MG capsule     OFFICE/OUTPT VISIT,EST,LEVL III      8. Screening PSA (prostate specific antigen)  Z12.5 PSA, screen     PSA, screen      Work on Healthy diet and exercise. Getting heart rate elevated for 30 mins most days of week.  medical conditions are stable. meds refilled.  Labs pending.   Blood pressure not controlled.  Will increase his lisinopril hydrochlorothiazide 20/12.5 to 2 tablets daily.  Recheck blood pressure in a month.  Warning signs side effects were discussed.    BMI  Estimated body mass index is 32.5 kg/m  as calculated from the following:    Height as of this encounter: 1.803 m (5' 11\").    Weight as of this encounter: 105.7 kg (233 lb).   Weight management plan: Discussed healthy diet and exercise guidelines    Counseling  Appropriate preventive services were addressed with this patient via screening, questionnaire, or discussion as appropriate for fall prevention, " nutrition, physical activity, Tobacco-use cessation, social engagement, weight loss and cognition.  Checklist reviewing preventive services available has been given to the patient.  Reviewed patient's diet, addressing concerns and/or questions.   He is at risk for lack of exercise and has been provided with information to increase physical activity for the benefit of his well-being.   He is at risk for psychosocial distress and has been provided with information to reduce risk.       Gema Watkins is a 59 year old, presenting for the following:  Physical        2/26/2025     6:50 AM   Additional Questions   Roomed by amber   Accompanied by self         2/26/2025     6:50 AM   Patient Reported Additional Medications   Patient reports taking the following new medications no          HPI    Patient is here today for physical.  He has a past medical history of hypertension A-fib high triglycerides along with 3 or 4 flareups of gout here, erectile dysfunction and urinary frequency.  He has noticed his blood pressure readings at home have been about 140/80.  He denies any chest pain or shortness of breath.  His gout flares are managed with diet and occasional use of indomethacin.  His urinary frequency is better controlled with Flomax.    Health Care Directive  Patient does not have a Health Care Directive: Discussed advance care planning with patient; information given to patient to review.      2/26/2025   General Health   How would you rate your overall physical health? Good   Feel stress (tense, anxious, or unable to sleep) To some extent   (!) STRESS CONCERN      2/26/2025   Nutrition   Three or more servings of calcium each day? Yes   Diet: Regular (no restrictions)    Low salt   How many servings of fruit and vegetables per day? (!) 2-3   How many sweetened beverages each day? 0-1       Multiple values from one day are sorted in reverse-chronological order         2/26/2025   Exercise   Days per week of  moderate/strenous exercise 3 days   Average minutes spent exercising at this level 30 min         2/26/2025   Social Factors   Frequency of gathering with friends or relatives Twice a week   Worry food won't last until get money to buy more No   Food not last or not have enough money for food? No   Do you have housing? (Housing is defined as stable permanent housing and does not include staying ouside in a car, in a tent, in an abandoned building, in an overnight shelter, or couch-surfing.) Yes   Are you worried about losing your housing? No   Lack of transportation? No   Unable to get utilities (heat,electricity)? No         2/26/2025   Fall Risk   Fallen 2 or more times in the past year? No   Trouble with walking or balance? No          2/26/2025   Dental   Dentist two times every year? Yes            Today's PHQ-2 Score:       2/26/2025     6:08 AM   PHQ-2 ( 1999 [employer])   Q1: Little interest or pleasure in doing things 0   Q2: Feeling down, depressed or hopeless 0   PHQ-2 Score 0    Q1: Little interest or pleasure in doing things Not at all   Q2: Feeling down, depressed or hopeless Not at all   PHQ-2 Score 0       Patient-reported           2/26/2025   Substance Use   Alcohol more than 3/day or more than 7/wk No   Do you use any other substances recreationally? No     Social History     Tobacco Use    Smoking status: Never    Smokeless tobacco: Never   Vaping Use    Vaping status: Never Used   Substance Use Topics    Alcohol use: Yes     Comment: 10 beers per week    Drug use: No             2/26/2025   One time HIV Screening   Previous HIV test? Yes         2/26/2025   STI Screening   New sexual partner(s) since last STI/HIV test? No   Last PSA:   PSA   Date Value Ref Range Status   06/23/2009 0.78 0 - 4 ug/L Final     Prostate Specific Antigen Screen   Date Value Ref Range Status   01/25/2024 0.89 0.00 - 4.50 ng/mL Final   09/16/2022 1.22 0.00 - 4.00 ug/L Final     ASCVD Risk   The 10-year ASCVD risk  score (Xiomara SAM, et al., 2019) is: 20.7%    Values used to calculate the score:      Age: 59 years      Sex: Male      Is Non- : No      Diabetic: No      Tobacco smoker: No      Systolic Blood Pressure: 150 mmHg      Is BP treated: Yes      HDL Cholesterol: 27 mg/dL      Total Cholesterol: 194 mg/dL         Reviewed and updated as needed this visit by Provider   Tobacco  Allergies  Meds  Problems  Med Hx  Surg Hx  Fam Hx            Past Medical History:   Diagnosis Date    CARDIOVASCULAR SCREENING; LDL GOAL LESS THAN 160      Past Surgical History:   Procedure Laterality Date    COLONOSCOPY WITH CO2 INSUFFLATION N/A 4/2/2015    Procedure: COLONOSCOPY WITH CO2 INSUFFLATION;  Surgeon: Alfredo Kowalski MD;  Location: [facility] OR    HERNIA REPAIR       Lab work is in process  Labs reviewed in EPIC  BP Readings from Last 3 Encounters:   02/26/25 (!) 150/88   05/16/24 (!) 146/89   04/25/24 (!) 146/82    Wt Readings from Last 3 Encounters:   02/26/25 105.7 kg (233 lb)   05/16/24 105 kg (231 lb 6.4 oz)   04/25/24 105.7 kg (233 lb)                  Patient Active Problem List   Diagnosis    CARDIOVASCULAR SCREENING; LDL GOAL LESS THAN 160    High triglycerides    FH: melanoma    Erectile dysfunction, unspecified erectile dysfunction type    Acute gouty arthritis    BMI 32.0-32.9,adult    Atrial fibrillation, unspecified type (H)    Hypertension goal BP (blood pressure) < 140/90    Urinary frequency     Past Surgical History:   Procedure Laterality Date    COLONOSCOPY WITH CO2 INSUFFLATION N/A 4/2/2015    Procedure: COLONOSCOPY WITH CO2 INSUFFLATION;  Surgeon: Alfredo Kowalski MD;  Location: [facility] OR    HERNIA REPAIR         Social History     Tobacco Use    Smoking status: Never    Smokeless tobacco: Never   Substance Use Topics    Alcohol use: Yes     Comment: 10 beers per week     Family History   Problem Relation Age of Onset    Cancer Father 53        skin cancer         Current  "Outpatient Medications   Medication Sig Dispense Refill    aspirin (ASA) 81 MG EC tablet Take 1 tablet (81 mg) by mouth daily      indomethacin (INDOCIN) 50 MG capsule Take 1 capsule (50 mg) by mouth 3 times daily (with meals). Stop when pain resolved 30 capsule 0    lisinopril-hydrochlorothiazide (ZESTORETIC) 20-12.5 MG tablet Take 2 tablets by mouth daily. 180 tablet 0    sildenafil (VIAGRA) 100 MG tablet Take 1 tablet (100 mg) by mouth daily as needed (ED). 30 tablet 3    tamsulosin (FLOMAX) 0.4 MG capsule Take 1 capsule (0.4 mg) by mouth daily. 90 capsule 3     No Known Allergies      Review of Systems  Constitutional, HEENT, cardiovascular, pulmonary, gi and gu systems are negative, except as otherwise noted.     Objective    Exam  BP (!) 150/88   Pulse 63   Temp (!) 96.7  F (35.9  C) (Tympanic)   Resp 16   Ht 1.803 m (5' 11\")   Wt 105.7 kg (233 lb)   SpO2 98%   BMI 32.50 kg/m     Estimated body mass index is 32.5 kg/m  as calculated from the following:    Height as of this encounter: 1.803 m (5' 11\").    Weight as of this encounter: 105.7 kg (233 lb).    Physical Exam  GENERAL: alert and no distress  EYES: Eyes grossly normal to inspection, PERRL and conjunctivae and sclerae normal  HENT: ear canals and TM's normal, nose and mouth without ulcers or lesions  NECK: no adenopathy, no asymmetry, masses, or scars  RESP: lungs clear to auscultation - no rales, rhonchi or wheezes  CV: regular rate and rhythm, normal S1 S2, no S3 or S4, no murmur, click or rub, no peripheral edema  ABDOMEN: soft, nontender, no hepatosplenomegaly, no masses and bowel sounds normal   (male): normal male genitalia without lesions or urethral discharge, no hernia  MS: no gross musculoskeletal defects noted, no edema  SKIN: no suspicious lesions or rashes  NEURO: Normal strength and tone, mentation intact and speech normal  PSYCH: mentation appears normal, affect normal/bright        Signed Electronically by: Byron Modi, " TANNER

## 2025-03-03 ENCOUNTER — PATIENT OUTREACH (OUTPATIENT)
Dept: CARE COORDINATION | Facility: CLINIC | Age: 60
End: 2025-03-03
Payer: COMMERCIAL

## 2025-03-05 ENCOUNTER — OFFICE VISIT (OUTPATIENT)
Dept: DERMATOLOGY | Facility: CLINIC | Age: 60
End: 2025-03-05
Payer: COMMERCIAL

## 2025-03-05 DIAGNOSIS — Z12.83 SKIN CANCER SCREENING: ICD-10-CM

## 2025-03-05 DIAGNOSIS — L57.0 ACTINIC KERATOSES: ICD-10-CM

## 2025-03-05 DIAGNOSIS — D48.5 NEOPLASM OF UNCERTAIN BEHAVIOR OF SKIN: ICD-10-CM

## 2025-03-05 DIAGNOSIS — L82.1 SEBORRHEIC KERATOSES: ICD-10-CM

## 2025-03-05 DIAGNOSIS — D22.9 BENIGN NEVUS: Primary | ICD-10-CM

## 2025-03-05 ASSESSMENT — PAIN SCALES - GENERAL: PAINLEVEL_OUTOF10: NO PAIN (0)

## 2025-03-05 NOTE — NURSING NOTE
Dimitris Munoz's chief complaint for this visit includes:  Chief Complaint   Patient presents with    Skin Check     FBSE: check Aks on cheeks.      PCP: Byron Modi    Referring Provider:  Referred Self, MD  No address on file    There were no vitals taken for this visit.  No Pain (0)      No Known Allergies      Do you need any medication refills at today's visit? No    ISMA Javier

## 2025-03-05 NOTE — NURSING NOTE
The following medication was given:     MEDICATION:  Lidocaine with epinephrine 1% [id number]  ROUTE: SQ  SITE: see procedure note  DOSE: 0.5mL  LOT #: [id number]  : [employer]  EXPIRATION DATE: 083026  NDC#: 17500-678-16  Was there drug waste? NO  Multi-dose vial: Yes    Muriel Ferreira CMA  March 5, 2025     Detail Level: Detailed

## 2025-03-05 NOTE — LETTER
3/5/2025      Dimitris Munoz  [id number] [facility] [id number]      Dear Colleague,    Thank you for referring your patient, Dimitris Munoz, to the [facility]. Please see a copy of my visit note below.    [facility] Dermatology Note  Encounter Date: Mar 5, 2025  Office Visit     Reviewed patients past medical history and pertinent chart review prior to patients visit today.     Dermatology Problem List:  Last skin check: 3/5/2025    0. NUB, left cheek, s/p  Mar 5, 2025     1. BCC, right lateral neck, s/p excision 2018  2. Actinic Keratosis, s/p cryotherapy   - HAK, mid back s/p bx  8/10/22 s/p cryo   - right cheek has been tx with cryo 3x - efudex initiated 23  3. History of benign biopsy  - Benign lichenoid keratosis, left upper arm s/p bx 8/10/22     Social History: Works as an  for [employer], rides bike to work. Has a cabin he spends time at. Wears sunscreen fairly diligently.  Family History: Father () and paternal aunt had melanoma.  ____________________________________________      CC: Skin Check (FBSE: check Aks on cheeks. )    HPI:  Mr. Dimitris Munoz is a(n) 59 year old male who presents today as a return patient for a full body skin cancer screening. The patient has a history of nonmelanoma skin cancer. The patient was last seen in dermatology on 2023.     Today, the patient reports concerns on the bilateral cheeks. He notes a new lesion on the left cheek that appeared this past summer. It has bled in the past with manipulation and is tender at times. The patient also has a history of an actinic keratosis on the right cheek. This was treated with Efudex 5% cream after his last visit, but is still slightly scaly.     No other cutaneous concerns. He reports being diligent with photoprotection.     Medications:  Current Outpatient Medications   Medication Sig Dispense Refill     aspirin (ASA) 81 MG EC tablet Take 1  tablet (81 mg) by mouth daily       indomethacin (INDOCIN) 50 MG capsule Take 1 capsule (50 mg) by mouth 3 times daily (with meals). Stop when pain resolved 30 capsule 0     lisinopril-hydrochlorothiazide (ZESTORETIC) 20-12.5 MG tablet Take 2 tablets by mouth daily. 180 tablet 0     sildenafil (VIAGRA) 100 MG tablet Take 1 tablet (100 mg) by mouth daily as needed (ED). 30 tablet 3     tamsulosin (FLOMAX) 0.4 MG capsule Take 1 capsule (0.4 mg) by mouth daily. 90 capsule 3     No current facility-administered medications for this visit.      Past Medical History:   Patient Active Problem List   Diagnosis     CARDIOVASCULAR SCREENING; LDL GOAL LESS THAN 160     High triglycerides     FH: melanoma     Erectile dysfunction, unspecified erectile dysfunction type     Acute gouty arthritis     BMI 32.0-32.9,adult     Atrial fibrillation, unspecified type (H)     Hypertension goal BP (blood pressure) < 140/90     Urinary frequency     Past Medical History:   Diagnosis Date     CARDIOVASCULAR SCREENING; LDL GOAL LESS THAN 160        ____________________________________________     Physical Exam:  Vitals: There were no vitals taken for this visit.   SKIN: Total skin excluding the genitalia areas was performed. The exam included the scalp, face, neck, bilateral arms, chest, back, abdomen, bilateral legs, digits, mons pubis, buttocks, and nails.   - Gay II.  - Multiple tan/brown macules and papules scattered throughout exam, consistent with benign nevi, many of which were examined under dermoscopy with no concerning features found  - Scattered tan, homogenous macules on sun exposed skin, consistent with solar lentigines  - Scattered waxy, stuck on appearing papules and patches, consistent with seborrheic keratoses  - Several 1-2 mm red dome shaped symmetric papules, consistent with cherry angiomas  - Right lateral neck, well healed scar with no signs of skin cancer recurrence  - Left cheek, 0.5 cm pink, slightly shiny,  thin papule with telangiectasis upon dermoscopy  - Right cheek x2 and right forehead x1, pink to red rough adherent papules, consistent with actinic keratoses (right cheek still appears clinically consistent with an AK, edge of lesion is still scaly)    - No other lesions of concern on areas examined        ____________________________________________      Assessment & Plan:   # Personal history of nonmelanoma skin cancer, BCC, right lateral neck, 2018  # Family history of melanoma, father and paternal aunt  - No signs of recurrence. Continued observation recommended.   - Sun protection: Counseled SPF 30+ sunscreen, UPF clothing, sun avoidance, tanning bed avoidance.    # Nevi, trunk and extremities  # Solar lentigines  - Nevi demonstrate no concerning features on dermoscopy. We discussed the importance of self exams at home.   - ABCDEs: Counseled ABCDEs of melanoma: Asymmetry, Border (irregularity), Color (not uniform, changes in color), Diameter (greater than 6 mm which is about the size of a pencil eraser), and Evolving (any changes in preexisting moles).    # Cherry angiomas  # Seborrheic keratoses  - We discussed the benign nature of the skin lesions. No treatment required. Continued observation recommended. Follow up with any concerns.      # Neoplasm of uncertain behavior:  Left cheek.  DDx includes basal cell carcinoma vs sebaceous hyperplasia. Shave biopsy today.  Procedure Note: Biopsy by shave technique  The risks and benefits of the procedure were described to the patient. These include but are not limited to bleeding, infection, scar, incomplete removal, and non-diagnostic biopsy. Verbal informed consent was obtained. The above site(s) was cleansed with an alcohol pad and injected with 1% lidocaine with epinephrine. Once anesthesia was obtained, a biopsy(ies) was performed with DermaBlade. The tissue(s) was placed in a labeled container(s) with formalin and sent to pathology. Hemostasis was achieved  with aluminum chloride. Vaseline and a bandage were applied to the wound(s). The patient tolerated the procedure well and was given post biopsy care instructions.     # Actinic keratoses x2  Actinic keratoses are pre-cancerous skin growths caused by sun exposure. Treatment is recommended and medically indicated. Treated with cryotherapy as outlined below.     Procedures performed: Cryotherapy  - Location(s): Right cheek x2 and right forehead x1. After verbal consent and discussion of risks and benefits including, but not limited to, dyspigmentation/scar, blister, and pain, 3 lesion(s) was(were) treated with 1-2 mm freeze border for 1-2 cycles with liquid nitrogen. Post cryotherapy instructions were provided. The patient should return if the lesions do not resolve.      Follow-up:  Annual for follow up full body skin exam, as needed for new or changing lesions or new concerns    All risks, benefits and alternatives were discussed with patient.  Patient is in agreement and understands the assessment and plan.  All questions were answered.    Brittney Mason PA-C  [facility] Dermatology      CC Referred Self, MD  No address on file on close of this encounter.      Again, thank you for allowing me to participate in the care of your patient.        Sincerely,        Brittney Mason PA-C    Electronically signed

## 2025-03-05 NOTE — PROGRESS NOTES
[facility] Dermatology Note  Encounter Date: Mar 5, 2025  Office Visit     Reviewed patients past medical history and pertinent chart review prior to patients visit today.     Dermatology Problem List:  Last skin check: 3/5/2025    0. NUB, left cheek, s/p  Mar 5, 2025     1. BCC, right lateral neck, s/p excision 2018  2. Actinic Keratosis, s/p cryotherapy   - HAK, mid back s/p bx  8/10/22 s/p cryo   - right cheek has been tx with cryo 3x - efudex initiated 23  3. History of benign biopsy  - Benign lichenoid keratosis, left upper arm s/p bx 8/10/22     Social History: Works as an  for [employer], rides bike to work. Has a cabin he spends time at. Wears sunscreen fairly diligently.  Family History: Father () and paternal aunt had melanoma.  ____________________________________________      CC: Skin Check (FBSE: check Aks on cheeks. )    HPI:  Mr. Dimitris Munoz is a(n) 59 year old male who presents today as a return patient for a full body skin cancer screening. The patient has a history of nonmelanoma skin cancer. The patient was last seen in dermatology on 2023.     Today, the patient reports concerns on the bilateral cheeks. He notes a new lesion on the left cheek that appeared this past summer. It has bled in the past with manipulation and is tender at times. The patient also has a history of an actinic keratosis on the right cheek. This was treated with Efudex 5% cream after his last visit, but is still slightly scaly.     No other cutaneous concerns. He reports being diligent with photoprotection.     Medications:  Current Outpatient Medications   Medication Sig Dispense Refill    aspirin (ASA) 81 MG EC tablet Take 1 tablet (81 mg) by mouth daily      indomethacin (INDOCIN) 50 MG capsule Take 1 capsule (50 mg) by mouth 3 times daily (with meals). Stop when pain resolved 30 capsule 0    lisinopril-hydrochlorothiazide (ZESTORETIC) 20-12.5 MG tablet Take 2  tablets by mouth daily. 180 tablet 0    sildenafil (VIAGRA) 100 MG tablet Take 1 tablet (100 mg) by mouth daily as needed (ED). 30 tablet 3    tamsulosin (FLOMAX) 0.4 MG capsule Take 1 capsule (0.4 mg) by mouth daily. 90 capsule 3     No current facility-administered medications for this visit.      Past Medical History:   Patient Active Problem List   Diagnosis    CARDIOVASCULAR SCREENING; LDL GOAL LESS THAN 160    High triglycerides    FH: melanoma    Erectile dysfunction, unspecified erectile dysfunction type    Acute gouty arthritis    BMI 32.0-32.9,adult    Atrial fibrillation, unspecified type (H)    Hypertension goal BP (blood pressure) < 140/90    Urinary frequency     Past Medical History:   Diagnosis Date    CARDIOVASCULAR SCREENING; LDL GOAL LESS THAN 160        ____________________________________________     Physical Exam:  Vitals: There were no vitals taken for this visit.   SKIN: Total skin excluding the genitalia areas was performed. The exam included the scalp, face, neck, bilateral arms, chest, back, abdomen, bilateral legs, digits, mons pubis, buttocks, and nails.   - Gay II.  - Multiple tan/brown macules and papules scattered throughout exam, consistent with benign nevi, many of which were examined under dermoscopy with no concerning features found  - Scattered tan, homogenous macules on sun exposed skin, consistent with solar lentigines  - Scattered waxy, stuck on appearing papules and patches, consistent with seborrheic keratoses  - Several 1-2 mm red dome shaped symmetric papules, consistent with cherry angiomas  - Right lateral neck, well healed scar with no signs of skin cancer recurrence  - Left cheek, 0.5 cm pink, slightly shiny, thin papule with telangiectasis upon dermoscopy  - Right cheek x2 and right forehead x1, pink to red rough adherent papules, consistent with actinic keratoses (right cheek still appears clinically consistent with an AK, edge of lesion is still scaly)    -  No other lesions of concern on areas examined        ____________________________________________      Assessment & Plan:   # Personal history of nonmelanoma skin cancer, BCC, right lateral neck, 2018  # Family history of melanoma, father and paternal aunt  - No signs of recurrence. Continued observation recommended.   - Sun protection: Counseled SPF 30+ sunscreen, UPF clothing, sun avoidance, tanning bed avoidance.    # Nevi, trunk and extremities  # Solar lentigines  - Nevi demonstrate no concerning features on dermoscopy. We discussed the importance of self exams at home.   - ABCDEs: Counseled ABCDEs of melanoma: Asymmetry, Border (irregularity), Color (not uniform, changes in color), Diameter (greater than 6 mm which is about the size of a pencil eraser), and Evolving (any changes in preexisting moles).    # Cherry angiomas  # Seborrheic keratoses  - We discussed the benign nature of the skin lesions. No treatment required. Continued observation recommended. Follow up with any concerns.      # Neoplasm of uncertain behavior:  Left cheek.  DDx includes basal cell carcinoma vs sebaceous hyperplasia. Shave biopsy today.  Procedure Note: Biopsy by shave technique  The risks and benefits of the procedure were described to the patient. These include but are not limited to bleeding, infection, scar, incomplete removal, and non-diagnostic biopsy. Verbal informed consent was obtained. The above site(s) was cleansed with an alcohol pad and injected with 1% lidocaine with epinephrine. Once anesthesia was obtained, a biopsy(ies) was performed with DermaBlade. The tissue(s) was placed in a labeled container(s) with formalin and sent to pathology. Hemostasis was achieved with aluminum chloride. Vaseline and a bandage were applied to the wound(s). The patient tolerated the procedure well and was given post biopsy care instructions.     # Actinic keratoses x2  Actinic keratoses are pre-cancerous skin growths caused by sun  exposure. Treatment is recommended and medically indicated. Treated with cryotherapy as outlined below.     Procedures performed: Cryotherapy  - Location(s): Right cheek x2 and right forehead x1. After verbal consent and discussion of risks and benefits including, but not limited to, dyspigmentation/scar, blister, and pain, 3 lesion(s) was(were) treated with 1-2 mm freeze border for 1-2 cycles with liquid nitrogen. Post cryotherapy instructions were provided. The patient should return if the lesions do not resolve.      Follow-up:  Annual for follow up full body skin exam, as needed for new or changing lesions or new concerns    All risks, benefits and alternatives were discussed with patient.  Patient is in agreement and understands the assessment and plan.  All questions were answered.    Brittney Mason PA-C  [facility] Dermatology      CC Referred Self, MD  No address on file on close of this encounter.

## 2025-04-16 ENCOUNTER — TELEPHONE (OUTPATIENT)
Dept: GASTROENTEROLOGY | Facility: CLINIC | Age: 60
End: 2025-04-16
Payer: COMMERCIAL

## 2025-04-16 NOTE — TELEPHONE ENCOUNTER
"Endoscopy Scheduling Screen    Caller: patient    Have you had any respiratory illness or flu-like symptoms in the last 10 days?  No    What is your communication preference for Instructions and/or Bowel Prep?   [facility]    What insurance is in the chart?  Other:  [facility]    Ordering/Referring Provider:   GAUDENCIO PALACIOS    (If ordering provider performs procedure, schedule with ordering provider unless otherwise instructed. )    BMI: Estimated body mass index is 32.64 kg/m  as calculated from the following:    Height as of 3/28/25: 1.803 m (5' 11\").    Weight as of 3/28/25: 106.1 kg (234 lb).     Sedation Ordered  moderate sedation.   If patient BMI > 50 do not schedule in [facility].    If patient BMI > 45 do not schedule at [facility].    Are you taking methadone or Suboxone?  NO, No RN review required.    Have you been diagnosed and are being treated for severe PTSD or severe anxiety?  NO, No RN review required.    Are you taking any prescription medications for pain 3 or more times per week?   NO, No RN review required.    Do you have a history of malignant hyperthermia?  No    (Females) Are you currently pregnant?   No     Have you been diagnosed or told you have pulmonary hypertension?   No    Do you have an LVAD?  No    Have you been told you have moderate to severe sleep apnea?  No.    Have you been told you have COPD, asthma, or any other lung disease?  No    Has your doctor ordered any cardiac tests like echo, angiogram, stress test, ablation, or EKG, that you have not completed yet?  No    Do you  have a history of any heart conditions?  Yes  afib    Have you had any hospitalizations  in the last year for heart related issues, for example a stent placement, heart attack, or cardiomyopathy?  No    Do you have any implantable devices in your body (pacemaker, ICD)?  No    Do you take nitroglycerine?  No    Have you ever had or are you waiting for an organ transplant?  No. Continue scheduling, no site " "restrictions.    Have you had a stroke or transient ischemic attack (TIA aka \"mini stroke\") in the last 2 years?   No.    Have you been diagnosed with or been told you have cirrhosis of the liver?   No.    Are you currently on dialysis?   No    Do you need assistance transferring?   No    BMI: Estimated body mass index is 32.64 kg/m  as calculated from the following:    Height as of 3/28/25: 1.803 m (5' 11\").    Weight as of 3/28/25: 106.1 kg (234 lb).     Is patients BMI > 40 and scheduling location [facility]?  No    Do you take an injectable or oral medication for weight loss or diabetes (excluding insulin)?  No    Do you take the medication Naltrexone?  No    Do you take blood thinners?  No       Prep   Are you currently on dialysis or do you have chronic kidney disease?  No    Do you have a diagnosis of diabetes?  No    Do you have a diagnosis of cystic fibrosis (CF)?  No    On a regular basis do you go 3 -5 days between bowel movements?  No    BMI > 40?  No    Preferred Pharmacy:    [id number] [address] [facility] [address] [facility]  [address] [place] [id number]  Phone: [phone] Fax: [phone]      Final Scheduling Details     Procedure scheduled  Colonoscopy    Surgeon:  Cassandra     Date of procedure:  5/2     Pre-OP / PAC:   No - Not required for this site.    Location  [facility] - [facility] - Patient preference.    Sedation   Moderate Sedation - Per order.      Patient Reminders:   You will receive a call from a Nurse to review instructions and health history.  This assessment must be completed prior to your procedure.  Failure to complete the Nurse assessment may result in the procedure being cancelled.      On the day of your procedure, please designate an adult(s) who can drive you home stay with you for the next 24 hours. The medicines used in the exam will make you sleepy. You will not be able to drive.      You cannot take public transportation, ride share " services, or non-medical taxi service without a responsible caregiver.  Medical transport services are allowed with the requirement that a responsible caregiver will receive you at your destination.  We require that drivers and caregivers are confirmed prior to your procedure.

## 2025-04-16 NOTE — TELEPHONE ENCOUNTER
Pre visit planning completed.      Procedure details:    Patient scheduled for Colonoscopy on 5/2/25.     Approximate arrival time: 0900. Procedure time 0945.   *Ensure patient is aware that endoscopy team will be calling about 2 days prior to procedure date to confirm arrival time as this may change.     Facility location: [facility]; [address] Floor, [place] [address]. Check in location: 2nd Floor at Surgery desk.  *Disclaimer: Drivers are to check in with patient and stay on campus during procedure.     Sedation type: Conscious sedation     Pre op exam needed? No.    Indication for procedure: Screening       Chart review:     Electronic implanted devices? No    Recent diagnosis of diverticulitis within the last 6 weeks? No      Medication review:    Diabetic? No    Anticoagulants? No    Weight loss medication/injectable? No GLP-1 medication per patient's medication list. Nursing to verify with pre-assessment call.    Other medication HOLDING recommendations:  N/A      Prep for procedure:     Bowel prep recommendation: Standard Miralax.   Due to: standard bowel prep    Procedure information and instructions sent via [facility]         Quynh Sparrow RN  Endoscopy Procedure Pre Assessment   [phone] option 3

## 2025-04-17 NOTE — TELEPHONE ENCOUNTER
Pre assessment completed for upcoming procedure.   (Please see previous telephone encounter notes for complete details)    I call and spoke with patient      Procedure details:    Approximate time and facility location reviewed.   Patient is aware that endoscopy team will be calling about 2 days prior to confirm arrival time.    Designated  policy reviewed and that site requests drivers to check in and stay on campus. Instructed to have someone stay 6  hours post procedure.   *Disclaimer - please notify the  RN GI staff with any  issues/concerns.    Medication review:    Medications reviewed. Please see supporting documentation below. Holding recommendations discussed (if applicable).       Prep for procedure:     Procedure prep instructions reviewed.        Any additional information needed:  Patient states that his  is not able to stay on campus during the entire procedure. He states that they can be available by phone and can be there within 15 minutes after called to pick him up after the procedure. A message has been sent to the procedure RN's at  to get an approval for this.       Patient verbalized understanding and had no questions or concerns at this time.      Mavis Herrera LPN  Endoscopy Procedure Pre Assessment   [phone] option 3

## 2025-04-30 ENCOUNTER — TELEPHONE (OUTPATIENT)
Dept: GASTROENTEROLOGY | Facility: CLINIC | Age: 60
End: 2025-04-30
Payer: COMMERCIAL

## 2025-05-02 ENCOUNTER — HOSPITAL ENCOUNTER (OUTPATIENT)
Facility: AMBULATORY SURGERY CENTER | Age: 60
Discharge: HOME | End: 2025-05-02
Attending: FAMILY MEDICINE | Admitting: FAMILY MEDICINE
Payer: COMMERCIAL

## 2025-05-02 VITALS
RESPIRATION RATE: 18 BRPM | HEART RATE: 56 BPM | OXYGEN SATURATION: 96 % | TEMPERATURE: 97.1 F | WEIGHT: 223 LBS | BODY MASS INDEX: 31.1 KG/M2 | DIASTOLIC BLOOD PRESSURE: 71 MMHG | SYSTOLIC BLOOD PRESSURE: 108 MMHG

## 2025-05-02 DIAGNOSIS — Z12.11 SCREEN FOR COLON CANCER: Primary | ICD-10-CM

## 2025-05-02 LAB — COLONOSCOPY: NORMAL

## 2025-05-02 PROCEDURE — G8907 PT DOC NO EVENTS ON DISCHARG: HCPCS

## 2025-05-02 PROCEDURE — 88305 TISSUE EXAM BY PATHOLOGIST: CPT | Mod: GC | Performed by: STUDENT IN AN ORGANIZED HEALTH CARE EDUCATION/TRAINING PROGRAM

## 2025-05-02 PROCEDURE — G8918 PT W/O PREOP ORDER IV AB PRO: HCPCS

## 2025-05-02 PROCEDURE — 45385 COLONOSCOPY W/LESION REMOVAL: CPT

## 2025-05-02 RX ORDER — LIDOCAINE 40 MG/G
CREAM TOPICAL
Status: DISCONTINUED | OUTPATIENT
Start: 2025-05-02 | End: 2025-05-03 | Stop reason: HOSPADM

## 2025-05-02 RX ORDER — ONDANSETRON 2 MG/ML
4 INJECTION INTRAMUSCULAR; INTRAVENOUS
Status: DISCONTINUED | OUTPATIENT
Start: 2025-05-02 | End: 2025-05-03 | Stop reason: HOSPADM

## 2025-05-02 NOTE — H&P
Pre-Endoscopy History and Physical     Dimitris Munoz MRN# [id number]   YOB: 1965 Age: 59 year old     Date of Procedure: 5/2/2025  Primary care provider: Byron Modi  Type of Endoscopy: colonoscopy  Reason for Procedure: screening  Type of Anesthesia Anticipated: MAC    HPI:    Dimitris is a 59 year old male who will be undergoing the above procedure.      A history and physical has been performed. The patient's medications and allergies have been reviewed. The risks and benefits of the procedure and the sedation options and risks were discussed with the patient.  All questions were answered and informed consent was obtained.      He denies a personal or family history of anesthesia complications or bleeding disorders.     No Known Allergies     Cannot display prior to admission medications because the patient has not been admitted in this contact.       Patient Active Problem List   Diagnosis    CARDIOVASCULAR SCREENING; LDL GOAL LESS THAN 160    High triglycerides    FH: melanoma    Erectile dysfunction, unspecified erectile dysfunction type    Acute gouty arthritis    BMI 32.0-32.9,adult    Atrial fibrillation, unspecified type (H)    Hypertension goal BP (blood pressure) < 140/90    Urinary frequency        Past Medical History:   Diagnosis Date    CARDIOVASCULAR SCREENING; LDL GOAL LESS THAN 160         Past Surgical History:   Procedure Laterality Date    COLONOSCOPY WITH CO2 INSUFFLATION N/A 4/2/2015    Procedure: COLONOSCOPY WITH CO2 INSUFFLATION;  Surgeon: Alfredo Kowalski MD;  Location: [facility] OR    HERNIA REPAIR         Social History     Tobacco Use    Smoking status: Never    Smokeless tobacco: Never   Substance Use Topics    Alcohol use: Yes     Comment: 10 beers per week       Family History   Problem Relation Age of Onset    Cancer Father 53        skin cancer       REVIEW OF SYSTEMS:     5 point ROS negative except as noted above in HPI, including Gen., Resp., CV, GI &  " system review.      PHYSICAL EXAM:   /79   Pulse 59   Temp 97.7  F (36.5  C) (Temporal)   Resp 16   Wt 101.2 kg (223 lb)   SpO2 97%   BMI 31.10 kg/m   Estimated body mass index is 31.1 kg/m  as calculated from the following:    Height as of 3/28/25: 1.803 m (5' 11\").    Weight as of this encounter: 101.2 kg (223 lb).   GENERAL APPEARANCE: healthy, alert, and no distress  MENTAL STATUS: alert and oriented x 3  AIRWAY EXAM: Mallampatti Class II (visualization of the soft palate, fauces, and uvula)  RESP: lungs clear to auscultation - no rales, rhonchi or wheezes  CV: regular rates and rhythm and normal S1 S2, no S3 or S4      DIAGNOSTICS:    Not indicated      IMPRESSION   ASA Class 2 - Mild systemic disease        PLAN:       Plan for colonoscopy. We discussed the risks, benefits and alternatives and the patient wished to proceed.    The above has been forwarded to the consulting provider.      Signed Electronically by: Bertha Trujillo MD  May 2, 2025    "

## 2025-05-05 LAB
PATH REPORT.COMMENTS IMP SPEC: NORMAL
PATH REPORT.COMMENTS IMP SPEC: NORMAL
PATH REPORT.FINAL DX SPEC: NORMAL
PATH REPORT.GROSS SPEC: NORMAL
PATH REPORT.MICROSCOPIC SPEC OTHER STN: NORMAL
PATH REPORT.RELEVANT HX SPEC: NORMAL
PHOTO IMAGE: NORMAL

## 2025-05-16 PROBLEM — D12.6 ADENOMATOUS POLYP OF COLON: Status: ACTIVE | Noted: 2025-05-16

## 2025-05-19 ENCOUNTER — PATIENT OUTREACH (OUTPATIENT)
Dept: GASTROENTEROLOGY | Facility: CLINIC | Age: 60
End: 2025-05-19
Payer: COMMERCIAL

## 2025-08-27 ENCOUNTER — OFFICE VISIT (OUTPATIENT)
Dept: FAMILY MEDICINE | Facility: CLINIC | Age: 60
End: 2025-08-27
Payer: COMMERCIAL

## 2025-08-27 ENCOUNTER — ANCILLARY PROCEDURE (OUTPATIENT)
Dept: GENERAL RADIOLOGY | Facility: CLINIC | Age: 60
End: 2025-08-27
Attending: PHYSICIAN ASSISTANT
Payer: COMMERCIAL

## 2025-08-27 VITALS
WEIGHT: 229.6 LBS | HEART RATE: 56 BPM | HEIGHT: 71 IN | OXYGEN SATURATION: 99 % | BODY MASS INDEX: 32.14 KG/M2 | RESPIRATION RATE: 18 BRPM | DIASTOLIC BLOOD PRESSURE: 78 MMHG | TEMPERATURE: 97.6 F | SYSTOLIC BLOOD PRESSURE: 139 MMHG

## 2025-08-27 DIAGNOSIS — M70.21 OLECRANON BURSITIS OF RIGHT ELBOW: Primary | ICD-10-CM

## 2025-08-27 DIAGNOSIS — M70.21 OLECRANON BURSITIS OF RIGHT ELBOW: ICD-10-CM

## 2025-08-27 PROCEDURE — 3075F SYST BP GE 130 - 139MM HG: CPT | Performed by: PHYSICIAN ASSISTANT

## 2025-08-27 PROCEDURE — 1126F AMNT PAIN NOTED NONE PRSNT: CPT | Performed by: PHYSICIAN ASSISTANT

## 2025-08-27 PROCEDURE — 99213 OFFICE O/P EST LOW 20 MIN: CPT | Performed by: PHYSICIAN ASSISTANT

## 2025-08-27 PROCEDURE — 73070 X-RAY EXAM OF ELBOW: CPT | Mod: TC | Performed by: RADIOLOGY

## 2025-08-27 PROCEDURE — 3078F DIAST BP <80 MM HG: CPT | Performed by: PHYSICIAN ASSISTANT

## 2025-08-27 ASSESSMENT — PAIN SCALES - GENERAL: PAINLEVEL_OUTOF10: NO PAIN (0)

## (undated) DEVICE — SOL WATER IRRIG 1000ML BOTTLE 07139-09

## (undated) DEVICE — LIFTER SURGICAL ASCENDO SUBMUCOSAL LIFT AGENT BX00712934